# Patient Record
Sex: MALE | Race: OTHER | HISPANIC OR LATINO | URBAN - METROPOLITAN AREA
[De-identification: names, ages, dates, MRNs, and addresses within clinical notes are randomized per-mention and may not be internally consistent; named-entity substitution may affect disease eponyms.]

---

## 2017-11-19 ENCOUNTER — EMERGENCY (EMERGENCY)
Facility: HOSPITAL | Age: 52
LOS: 1 days | Discharge: HOME | End: 2017-11-19
Attending: EMERGENCY MEDICINE | Admitting: EMERGENCY MEDICINE
Payer: MEDICARE

## 2017-11-19 VITALS
DIASTOLIC BLOOD PRESSURE: 81 MMHG | HEART RATE: 94 BPM | SYSTOLIC BLOOD PRESSURE: 133 MMHG | RESPIRATION RATE: 18 BRPM | TEMPERATURE: 98 F | WEIGHT: 195.77 LBS | OXYGEN SATURATION: 96 %

## 2017-11-19 DIAGNOSIS — M62.81 MUSCLE WEAKNESS (GENERALIZED): ICD-10-CM

## 2017-11-19 DIAGNOSIS — F10.10 ALCOHOL ABUSE, UNCOMPLICATED: ICD-10-CM

## 2017-11-19 PROCEDURE — 99284 EMERGENCY DEPT VISIT MOD MDM: CPT | Mod: 25

## 2017-11-19 NOTE — ED ADULT TRIAGE NOTE - CHIEF COMPLAINT QUOTE
s/p fall this morning with right leg pain, hx of alcohol withdrawal, Hx of stroke 10 years ago with right sided body weakness, pt wants to go to detox center,  information and addresses of detox center provided to the pt.

## 2017-11-20 VITALS
OXYGEN SATURATION: 99 % | DIASTOLIC BLOOD PRESSURE: 78 MMHG | HEART RATE: 88 BPM | TEMPERATURE: 98 F | SYSTOLIC BLOOD PRESSURE: 132 MMHG | RESPIRATION RATE: 18 BRPM

## 2017-11-20 PROCEDURE — 99283 EMERGENCY DEPT VISIT LOW MDM: CPT

## 2017-11-20 RX ADMIN — Medication 25 MILLIGRAM(S): at 00:41

## 2017-11-20 NOTE — ED PROVIDER NOTE - OBJECTIVE STATEMENT
52M with hx of alcohol abuse, hx of stroke 9 years ago, states he is scheduled to get into detox tomorrow. States his last drink was 3-4 hours ago, feels he may be withdrawing but motivated to quit. Denies headache, sleeping comfortably, denies trauma, fall, head injury or any discomfort. States he has his usual post stroke pain in his R leg.

## 2017-11-20 NOTE — ED PROVIDER NOTE - CONSTITUTIONAL, MLM
normal... Well appearing, well nourished, awake, alert, oriented to person, place, situation and in no apparent distress. sleeping comfortably

## 2017-11-20 NOTE — ED PROVIDER NOTE - MEDICAL DECISION MAKING DETAILS
52M with hx of alcohol abuse, alcohol w/d denies fall, walked to ER himself, tolerating PO sandwich, sleeping comfortably, mild tremor but otherwise no pain, no new injury chronic issue with stroke, pt has mild w/d symptoms on exam, has detox tomorrow in AM. Plan for Librium and discharge home to detox.

## 2018-11-05 ENCOUNTER — EMERGENCY (EMERGENCY)
Facility: HOSPITAL | Age: 53
LOS: 1 days | Discharge: HOME | End: 2018-11-05
Attending: EMERGENCY MEDICINE | Admitting: EMERGENCY MEDICINE
Payer: SELF-PAY

## 2018-11-05 VITALS
HEART RATE: 97 BPM | SYSTOLIC BLOOD PRESSURE: 142 MMHG | WEIGHT: 169.98 LBS | RESPIRATION RATE: 14 BRPM | OXYGEN SATURATION: 97 % | DIASTOLIC BLOOD PRESSURE: 90 MMHG | TEMPERATURE: 98 F

## 2018-11-05 DIAGNOSIS — M54.9 DORSALGIA, UNSPECIFIED: ICD-10-CM

## 2018-11-05 DIAGNOSIS — M62.830 MUSCLE SPASM OF BACK: ICD-10-CM

## 2018-11-05 DIAGNOSIS — F17.210 NICOTINE DEPENDENCE, CIGARETTES, UNCOMPLICATED: ICD-10-CM

## 2018-11-05 DIAGNOSIS — Z98.890 OTHER SPECIFIED POSTPROCEDURAL STATES: Chronic | ICD-10-CM

## 2018-11-05 LAB
ALBUMIN SERPL ELPH-MCNC: 4 G/DL — SIGNIFICANT CHANGE UP (ref 3.4–5)
ALP SERPL-CCNC: 71 U/L — SIGNIFICANT CHANGE UP (ref 40–120)
ALT FLD-CCNC: 27 U/L — SIGNIFICANT CHANGE UP (ref 12–42)
AMPHET UR-MCNC: NEGATIVE — SIGNIFICANT CHANGE UP
ANION GAP SERPL CALC-SCNC: 8 MMOL/L — LOW (ref 9–16)
APPEARANCE UR: CLEAR — SIGNIFICANT CHANGE UP
AST SERPL-CCNC: 27 U/L — SIGNIFICANT CHANGE UP (ref 15–37)
BARBITURATES UR SCN-MCNC: NEGATIVE — SIGNIFICANT CHANGE UP
BENZODIAZ UR-MCNC: POSITIVE
BILIRUB SERPL-MCNC: 0.8 MG/DL — SIGNIFICANT CHANGE UP (ref 0.2–1.2)
BILIRUB UR-MCNC: ABNORMAL
BUN SERPL-MCNC: 10 MG/DL — SIGNIFICANT CHANGE UP (ref 7–23)
CALCIUM SERPL-MCNC: 9.5 MG/DL — SIGNIFICANT CHANGE UP (ref 8.5–10.5)
CHLORIDE SERPL-SCNC: 102 MMOL/L — SIGNIFICANT CHANGE UP (ref 96–108)
CO2 SERPL-SCNC: 29 MMOL/L — SIGNIFICANT CHANGE UP (ref 22–31)
COCAINE METAB.OTHER UR-MCNC: POSITIVE
COLOR SPEC: YELLOW — SIGNIFICANT CHANGE UP
CREAT SERPL-MCNC: 1.02 MG/DL — SIGNIFICANT CHANGE UP (ref 0.5–1.3)
DIFF PNL FLD: NEGATIVE — SIGNIFICANT CHANGE UP
EPI CELLS # UR: SIGNIFICANT CHANGE UP /HPF (ref 0–5)
ETHANOL SERPL-MCNC: <3 MG/DL — SIGNIFICANT CHANGE UP
GLUCOSE SERPL-MCNC: 98 MG/DL — SIGNIFICANT CHANGE UP (ref 70–99)
GLUCOSE UR QL: NEGATIVE — SIGNIFICANT CHANGE UP
HCT VFR BLD CALC: 43.8 % — SIGNIFICANT CHANGE UP (ref 39–50)
HGB BLD-MCNC: 15.4 G/DL — SIGNIFICANT CHANGE UP (ref 13–17)
KETONES UR-MCNC: 40 MG/DL
LEUKOCYTE ESTERASE UR-ACNC: NEGATIVE — SIGNIFICANT CHANGE UP
LIDOCAIN IGE QN: 159 U/L — SIGNIFICANT CHANGE UP (ref 73–393)
MCHC RBC-ENTMCNC: 33.2 PG — SIGNIFICANT CHANGE UP (ref 27–34)
MCHC RBC-ENTMCNC: 35.2 G/DL — SIGNIFICANT CHANGE UP (ref 32–36)
MCV RBC AUTO: 94.4 FL — SIGNIFICANT CHANGE UP (ref 80–100)
METHADONE UR-MCNC: NEGATIVE — SIGNIFICANT CHANGE UP
NITRITE UR-MCNC: NEGATIVE — SIGNIFICANT CHANGE UP
OPIATES UR-MCNC: NEGATIVE — SIGNIFICANT CHANGE UP
PCP SPEC-MCNC: SIGNIFICANT CHANGE UP
PCP UR-MCNC: NEGATIVE — SIGNIFICANT CHANGE UP
PH UR: 6 — SIGNIFICANT CHANGE UP (ref 5–8)
PLATELET # BLD AUTO: 136 K/UL — LOW (ref 150–400)
POTASSIUM SERPL-MCNC: 4.3 MMOL/L — SIGNIFICANT CHANGE UP (ref 3.5–5.3)
POTASSIUM SERPL-SCNC: 4.3 MMOL/L — SIGNIFICANT CHANGE UP (ref 3.5–5.3)
PROT SERPL-MCNC: 8.4 G/DL — HIGH (ref 6.4–8.2)
PROT UR-MCNC: ABNORMAL MG/DL
RBC # BLD: 4.64 M/UL — SIGNIFICANT CHANGE UP (ref 4.2–5.8)
RBC # FLD: 12.4 % — SIGNIFICANT CHANGE UP (ref 10.3–14.5)
RBC CASTS # UR COMP ASSIST: < 5 /HPF — SIGNIFICANT CHANGE UP
SODIUM SERPL-SCNC: 139 MMOL/L — SIGNIFICANT CHANGE UP (ref 132–145)
SP GR SPEC: >=1.03 — SIGNIFICANT CHANGE UP (ref 1–1.03)
THC UR QL: NEGATIVE — SIGNIFICANT CHANGE UP
TROPONIN I SERPL-MCNC: <0.017 NG/ML — LOW (ref 0.02–0.06)
UROBILINOGEN FLD QL: 1 E.U./DL — SIGNIFICANT CHANGE UP
WBC # BLD: 7.8 K/UL — SIGNIFICANT CHANGE UP (ref 3.8–10.5)
WBC # FLD AUTO: 7.8 K/UL — SIGNIFICANT CHANGE UP (ref 3.8–10.5)
WBC UR QL: < 5 /HPF — SIGNIFICANT CHANGE UP

## 2018-11-05 PROCEDURE — 99284 EMERGENCY DEPT VISIT MOD MDM: CPT | Mod: 25

## 2018-11-05 PROCEDURE — 70450 CT HEAD/BRAIN W/O DYE: CPT | Mod: 26

## 2018-11-05 PROCEDURE — 99053 MED SERV 10PM-8AM 24 HR FAC: CPT

## 2018-11-05 PROCEDURE — 93010 ELECTROCARDIOGRAM REPORT: CPT

## 2018-11-05 RX ORDER — METHOCARBAMOL 500 MG/1
1500 TABLET, FILM COATED ORAL ONCE
Qty: 0 | Refills: 0 | Status: DISCONTINUED | OUTPATIENT
Start: 2018-11-05 | End: 2018-11-05

## 2018-11-05 RX ORDER — CYCLOBENZAPRINE HYDROCHLORIDE 10 MG/1
10 TABLET, FILM COATED ORAL ONCE
Qty: 0 | Refills: 0 | Status: COMPLETED | OUTPATIENT
Start: 2018-11-05 | End: 2018-11-05

## 2018-11-05 RX ORDER — SODIUM CHLORIDE 9 MG/ML
3 INJECTION INTRAMUSCULAR; INTRAVENOUS; SUBCUTANEOUS ONCE
Qty: 0 | Refills: 0 | Status: COMPLETED | OUTPATIENT
Start: 2018-11-05 | End: 2018-11-05

## 2018-11-05 RX ORDER — KETOROLAC TROMETHAMINE 30 MG/ML
15 SYRINGE (ML) INJECTION ONCE
Qty: 0 | Refills: 0 | Status: DISCONTINUED | OUTPATIENT
Start: 2018-11-05 | End: 2018-11-05

## 2018-11-05 RX ADMIN — SODIUM CHLORIDE 3 MILLILITER(S): 9 INJECTION INTRAMUSCULAR; INTRAVENOUS; SUBCUTANEOUS at 01:21

## 2018-11-05 RX ADMIN — CYCLOBENZAPRINE HYDROCHLORIDE 10 MILLIGRAM(S): 10 TABLET, FILM COATED ORAL at 01:20

## 2018-11-05 RX ADMIN — Medication 15 MILLIGRAM(S): at 01:20

## 2018-11-05 NOTE — ED PROVIDER NOTE - ENMT, MLM
Airway patent, Nasal mucosa clear. Mouth with normal mucosa. Throat has no vesicles, no oropharyngeal exudates and uvula is midline. Craniotomy cicatrix left parietal.

## 2018-11-05 NOTE — ED ADULT NURSE NOTE - CHIEF COMPLAINT QUOTE
BIBA from [facility] shelter on [address] c/o generalized body aches and pain. Ambulated with cane at baseline. denies any recent fall/injury/trauma and no s/s of such, admits to drinking 4 cans of beer and smoking thc. hx cva.

## 2018-11-05 NOTE — ED ADULT NURSE NOTE - NURSING NEURO LEVEL OF CONSCIOUSNESS
admits to drinking four cans and beer and smoking thc. no s/s injury/trauma no neuro deficits noted.

## 2018-11-05 NOTE — ED ADULT TRIAGE NOTE - CHIEF COMPLAINT QUOTE
BIBA from [facility] shelter on [address] c/o generalized body aches and pain. Ambulated with cane at baseline. denies any recent fall/injury/trauma and no s/s of such. hx cva. BIBA from [facility] shelter on [address] c/o generalized body aches and pain. Ambulated with cane at baseline. denies any recent fall/injury/trauma and no s/s of such, admits to drinking 4 cans of beer and smoking thc. hx cva.

## 2018-11-05 NOTE — ED PROVIDER NOTE - PSYCHIATRIC NEGATIVE STATEMENT, MLM
Initial musculoskeletal treatment recommendation:    1.  Stretch the calf muscles as instructed once an hour.  2.  Ice the injured area in the evening; 20 min on/off.  3.  Take antiinflammatory medication as directed.  4.  Massage the soft tissues around the injured area in the morning to loosen the tissue  5.  Wear supportive foot wear and/or arch supports (rigid not cushion).      If no improvement in symptoms within four to six weeks, return to clinic for reevaluation.       no known mental health issues.

## 2018-11-05 NOTE — ED ADULT NURSE NOTE - CHPI ED NUR SYMPTOMS NEG
no neck tenderness/no tingling/no constipation/no bladder dysfunction/no fatigue/no motor function loss/no anorexia/no difficulty bearing weight/no bowel dysfunction/no numbness
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
## 2018-12-21 ENCOUNTER — HOSPITAL ENCOUNTER (INPATIENT)
Dept: HOSPITAL 74 - YASAS | Age: 53
LOS: 9 days | Discharge: HOME | DRG: 772 | End: 2018-12-30
Attending: PSYCHIATRY & NEUROLOGY | Admitting: PSYCHIATRY & NEUROLOGY
Payer: COMMERCIAL

## 2018-12-21 VITALS — BODY MASS INDEX: 31 KG/M2

## 2018-12-21 DIAGNOSIS — I69.851: ICD-10-CM

## 2018-12-21 DIAGNOSIS — F03.90: ICD-10-CM

## 2018-12-21 DIAGNOSIS — F32.9: ICD-10-CM

## 2018-12-21 DIAGNOSIS — F14.20: ICD-10-CM

## 2018-12-21 DIAGNOSIS — F10.282: ICD-10-CM

## 2018-12-21 DIAGNOSIS — F12.20: ICD-10-CM

## 2018-12-21 DIAGNOSIS — F10.20: Primary | ICD-10-CM

## 2018-12-21 LAB
APPEARANCE UR: (no result)
BILIRUB UR STRIP.AUTO-MCNC: NEGATIVE MG/DL
COLOR UR: YELLOW
KETONES UR QL STRIP: NEGATIVE
LEUKOCYTE ESTERASE UR QL STRIP.AUTO: NEGATIVE
NITRITE UR QL STRIP: NEGATIVE
PH UR: 6 [PH] (ref 5–8)
PROT UR QL STRIP: NEGATIVE
PROT UR QL STRIP: NEGATIVE
SP GR UR: 1.01 (ref 1.01–1.03)
UROBILINOGEN UR STRIP-MCNC: NEGATIVE MG/DL (ref 0.2–1)

## 2018-12-21 PROCEDURE — HZ42ZZZ GROUP COUNSELING FOR SUBSTANCE ABUSE TREATMENT, COGNITIVE-BEHAVIORAL: ICD-10-PCS | Performed by: PSYCHIATRY & NEUROLOGY

## 2018-12-21 RX ADMIN — GABAPENTIN SCH: 300 CAPSULE ORAL at 21:34

## 2018-12-21 RX ADMIN — Medication SCH MG: at 21:34

## 2018-12-21 RX ADMIN — GABAPENTIN SCH MG: 300 CAPSULE ORAL at 21:34

## 2018-12-21 NOTE — HP
CIWA Score





- Admission Criteria


[facility] Guidelines: Admission for Medically Managed Detox: 


Requires at least one of the followin. CIWA greater than 12


2. Seizures within the past 24 hours


3. Delirium tremens within the past 24 hours


4. Hallucinations within the past 24 hours


5. Acute intervention needed for co  occurring medical disorder


6. Acute intervention needed for co  occurring psychiatric disorder


7. Severe withdrawal that cannot be handled at a lower level of care (continued


    vomiting, continued diarrhea, abnormal vital signs) requiring intravenous


    medication and/or fluids


8. Pregnancy








Admission ROS S





- HPI


Allergies/Adverse Reactions: 


 Allergies











Allergy/AdvReac Type Severity Reaction Status Date / Time


 


No Known Allergies Allergy   Verified 18 14:31











History of Present Illness: 





patient here for rehab , after detox @ [facility] d/c today, has d/c 

paperwork indicating same , prior ETOH use 4-5  large cans  beer/day since age 

21 , longest  sobriety 7 years at age 31 ( stopped on his own ) , re-started 

with friends , reports irritability if  not drinking , had CVA  12 years ago 

from cociane use with residual right -sided weakness, using cane for ambulation 

.


utox + bzo 


cocaine : crack 200 $/day latest use 2  weeks ago , denies IVDU  , denies 

current  daily use 


tobacco : occasional  use 


cannabis :  occasional 


PMHX :  liver disease 


PShx :brain surgery for ICH 12 years ago at [facility] 


psych : denies 


SHx : lives in shelter, homeless  


Exam Limitations: Clinical Condition





- Ebola screening


Have you traveled outside of the country in the last 21 days: No (N)


Have you had contact with anyone from an Ebola affected area: No


Have you been sick,other than usual withdrawal symptoms: No


Do you have a fever: No





- Review of Systems


Constitutional: See HPI


EENT: reports: No Symptoms Reported


Respiratory: reports: No Symptoms reported


Cardiac: reports: No Symptoms Reported


GI: reports: No Symptoms Reported


: reports: No Symptoms Reported


Musculoskeletal: reports: See HPI


Integumentary: reports: Rash (on face , given ointment at [place])


Neuro: reports: See HPI, Pre-Existing Deficit, Weakness, Unsteady Gait


Endocrine: reports: No Symptoms Reported


Psychiatric: reports: Orientated x3, Anxious





Patient History





- Patient Medical History


Hx Anemia: No


Hx Asthma: No


Hx Chronic Obstructive Pulmonary Disease (COPD): No


Hx Cancer: No


Hx Cardiac Disorders: No


Hx Congestive Heart Failure: No


Hx Hypertension: No


Hx Hypercholesterolemia: No


HX Cerebrovascular Accident: Yes (WITH RIGHT SIDED WEAKNESS IN 2010 )


Hx Seizures: No


Hx Dementia: Yes (FORGETFULNESS DUE TO STROKE)


Hx Diabetes: No


Hx Gastrointestinal Disorders: No


Hx Liver Disease: Yes (BUT NOT SURE WHAT THE DX)


Hx Genitourinary Disorders: No


Hx Sexually Transmitted Disorders: No


Hx Renal Disease (ESRD): No


Hx Thyroid Disease: No


Hx Human Immunodeficiency Virus (HIV): No (NEGATIVE HX)


Hx Hepatitis C:  (NOT SURE BUT SAYS HAS "A BAD LIVER BUT NOT ALL THAT BAD")


Hx Depression: Yes (B/C LIVE BY MYSELF)


Hx Suicide Attempt: No (DENIES)


Hx Bipolar Disorder: No


Hx Schizophrenia: No





- Patient Surgical History


Past Surgical History: Yes


Hx Neurologic Surgery: Yes (left craniotomy in  (stroke))


Hx Cataract Extraction: No


Hx Cardiac Surgery: No


Hx Lung Surgery: No


Hx Breast Surgery: No


Hx Breast Biopsy: No


Hx Abdominal Surgery: No


Hx Appendectomy: No


Hx Cholecystectomy: No


Hx Genitourinary Surgery: No


Hx  Section: No


Hx Orthopedic Surgery: No


Anesthesia Reaction: No





- PPD History


Date: 16





- Smoking Cessation


Smoking history: Current every day smoker


Have you smoked in the past 12 months: Yes


Aproximately how many cigarettes per day: 2


Hx Chewing Tobacco Use: No


Initiated information on smoking cessation: No





- Substances Abused


  ** Crack


Route: Smoking


Frequency: 1-2 times per week


Amount used: $100


Age of first use: 31


Date of Last Use: 12/10/18





  ** Alcohol-beer


Route: Oral


Frequency: Daily


Amount used: 4-5 (24 oz.)


Age of first use: 31


Date of Last Use: 18





Family Disease History





- Family Disease History


Family Disease History: Other: Brother (SUBSTANCE ABUSE), Sister (SUBSTANCE 

ABUSE)





Admission Physical Exam BHS





- Vital Signs


Vital Signs: 


 Vital Signs - 24 hr











  18





  11:37


 


Temperature 98.2 F


 


Pulse Rate 79


 


Respiratory 20





Rate 


 


Blood Pressure 97/69














- Physical


General Appearance: Yes: No Apparent Distress, Other (ambulating with cane)


HEENTM: Yes: Hearing grossly Normal, Normocephalic, Normal Voice


Respiratory: Yes: Chest Non-Tender, Lungs Clear, Normal Breath Sounds


Neck: Yes: No masses,lesions,Nodules, Trachea in good position


Breast: Yes: Breast Exam Deferred, Within Normal Limits


Cardiology: Yes: Regular Rhythm, Regular Rate, S1, S2


Abdominal: Yes: Normal Bowel Sounds, Soft, Protuberent


Genitourinary: Yes: Within Normal Limits


Back: Yes: Muscle Spasm


Musculoskeletal: Yes: Muscle weakness (r-sided weakness s/p CVA), Other


Extremities: Yes: Normal Capillary Refill, Normal Inspection, Normal Range of 

Motion


Neurological: Yes: Normal Mood/Affect, Other (R-sided weakness  , r hand  

contracture , mild  foot drop right)


Integumentary: Yes: Normal Color, Dry, Warm





BHS Breath Alcohol Content


Breath Alcohol Content: 0





Urine Drug Screen





- Results


Drug Screen Negative: No


Urine Drug Screen Results: BZO-Benzodiazepines





Inpatient Rehab Admission





- Initial Determination


Are CD services needed?: Yes


Free of communicable disease: Yes


Not in need of hospitalization: Yes





- Rehab Admission Criteria


Previous failed treatment: Yes


Poor recovery environment: Yes


Comorbidities: Yes


Lacks judgement: Yes


Patient is meeting Inpatient Rehab admission criteria:: Yes

## 2018-12-22 LAB
ALBUMIN SERPL-MCNC: 3.9 G/DL (ref 3.4–5)
ALP SERPL-CCNC: 61 U/L (ref 45–117)
ALT SERPL-CCNC: 29 U/L (ref 13–61)
ANION GAP SERPL CALC-SCNC: 6 MMOL/L (ref 8–16)
AST SERPL-CCNC: 24 U/L (ref 15–37)
BILIRUB SERPL-MCNC: 0.4 MG/DL (ref 0.2–1)
BUN SERPL-MCNC: 15 MG/DL (ref 7–18)
CALCIUM SERPL-MCNC: 9.2 MG/DL (ref 8.5–10.1)
CHLORIDE SERPL-SCNC: 100 MMOL/L (ref 98–107)
CO2 SERPL-SCNC: 31 MMOL/L (ref 21–32)
CREAT SERPL-MCNC: 0.9 MG/DL (ref 0.55–1.3)
DEPRECATED RDW RBC AUTO: 13.9 % (ref 11.9–15.9)
GLUCOSE SERPL-MCNC: 85 MG/DL (ref 74–106)
HCT VFR BLD CALC: 48.7 % (ref 35.4–49)
HGB BLD-MCNC: 16.2 GM/DL (ref 11.7–16.9)
MCH RBC QN AUTO: 32.3 PG (ref 25.7–33.7)
MCHC RBC AUTO-ENTMCNC: 33.3 G/DL (ref 32–35.9)
MCV RBC: 97.1 FL (ref 80–96)
PLATELET # BLD AUTO: 150 K/MM3 (ref 134–434)
PMV BLD: 8.4 FL (ref 7.5–11.1)
POTASSIUM SERPLBLD-SCNC: 4.7 MMOL/L (ref 3.5–5.1)
PROT SERPL-MCNC: 7.9 G/DL (ref 6.4–8.2)
RBC # BLD AUTO: 5.01 M/MM3 (ref 4–5.6)
SODIUM SERPL-SCNC: 136 MMOL/L (ref 136–145)
WBC # BLD AUTO: 4.7 K/MM3 (ref 4–10)

## 2018-12-22 RX ADMIN — GABAPENTIN SCH MG: 300 CAPSULE ORAL at 22:00

## 2018-12-22 RX ADMIN — IBUPROFEN PRN MG: 400 TABLET, FILM COATED ORAL at 19:28

## 2018-12-22 RX ADMIN — GABAPENTIN SCH MG: 300 CAPSULE ORAL at 06:27

## 2018-12-22 RX ADMIN — Medication SCH TAB: at 10:03

## 2018-12-22 RX ADMIN — Medication SCH MG: at 22:00

## 2018-12-22 RX ADMIN — BACLOFEN SCH MG: 10 TABLET ORAL at 10:03

## 2018-12-22 RX ADMIN — GABAPENTIN SCH MG: 300 CAPSULE ORAL at 13:55
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
## 2018-12-23 RX ADMIN — IBUPROFEN PRN MG: 400 TABLET, FILM COATED ORAL at 16:58

## 2018-12-23 RX ADMIN — Medication SCH TAB: at 10:32

## 2018-12-23 RX ADMIN — GABAPENTIN SCH MG: 300 CAPSULE ORAL at 14:17

## 2018-12-23 RX ADMIN — BACLOFEN SCH MG: 10 TABLET ORAL at 10:32

## 2018-12-23 RX ADMIN — Medication SCH MG: at 21:26

## 2018-12-23 RX ADMIN — GABAPENTIN SCH MG: 300 CAPSULE ORAL at 21:26

## 2018-12-23 RX ADMIN — GABAPENTIN SCH MG: 300 CAPSULE ORAL at 06:19

## 2018-12-24 RX ADMIN — BACLOFEN SCH MG: 10 TABLET ORAL at 10:34

## 2018-12-24 RX ADMIN — GABAPENTIN SCH MG: 300 CAPSULE ORAL at 21:31

## 2018-12-24 RX ADMIN — Medication SCH MG: at 21:30

## 2018-12-24 RX ADMIN — Medication SCH TAB: at 10:34

## 2018-12-24 RX ADMIN — GABAPENTIN SCH MG: 300 CAPSULE ORAL at 14:21

## 2018-12-24 RX ADMIN — Medication PRN MG: at 21:32

## 2018-12-24 RX ADMIN — GABAPENTIN SCH MG: 300 CAPSULE ORAL at 06:33

## 2018-12-25 RX ADMIN — GABAPENTIN SCH MG: 300 CAPSULE ORAL at 21:20

## 2018-12-25 RX ADMIN — IBUPROFEN PRN MG: 400 TABLET, FILM COATED ORAL at 06:23

## 2018-12-25 RX ADMIN — GABAPENTIN SCH MG: 300 CAPSULE ORAL at 13:25

## 2018-12-25 RX ADMIN — Medication PRN MG: at 21:20

## 2018-12-25 RX ADMIN — GABAPENTIN SCH MG: 300 CAPSULE ORAL at 06:22

## 2018-12-25 RX ADMIN — TOLNAFTATE SCH: 10 CREAM TOPICAL at 21:20

## 2018-12-25 RX ADMIN — Medication SCH TAB: at 10:32

## 2018-12-25 RX ADMIN — TOLNAFTATE SCH APPLIC: 10 CREAM TOPICAL at 13:25

## 2018-12-25 RX ADMIN — IBUPROFEN PRN MG: 400 TABLET, FILM COATED ORAL at 19:09

## 2018-12-25 RX ADMIN — Medication SCH MG: at 21:22

## 2018-12-25 RX ADMIN — BACLOFEN SCH MG: 10 TABLET ORAL at 10:32

## 2018-12-26 RX ADMIN — GABAPENTIN SCH MG: 300 CAPSULE ORAL at 21:29

## 2018-12-26 RX ADMIN — GABAPENTIN SCH MG: 300 CAPSULE ORAL at 13:37

## 2018-12-26 RX ADMIN — Medication SCH MG: at 21:29

## 2018-12-26 RX ADMIN — Medication PRN MG: at 21:29

## 2018-12-26 RX ADMIN — IBUPROFEN PRN MG: 400 TABLET, FILM COATED ORAL at 10:20

## 2018-12-26 RX ADMIN — GABAPENTIN SCH MG: 300 CAPSULE ORAL at 06:17

## 2018-12-26 RX ADMIN — Medication SCH TAB: at 10:20

## 2018-12-26 RX ADMIN — TOLNAFTATE SCH: 10 CREAM TOPICAL at 10:20

## 2018-12-26 RX ADMIN — BACLOFEN SCH MG: 10 TABLET ORAL at 10:20

## 2018-12-26 RX ADMIN — IBUPROFEN PRN MG: 400 TABLET, FILM COATED ORAL at 21:29

## 2018-12-26 RX ADMIN — TOLNAFTATE SCH APPLIC: 10 CREAM TOPICAL at 21:30

## 2018-12-27 RX ADMIN — Medication SCH TAB: at 10:32

## 2018-12-27 RX ADMIN — TOLNAFTATE SCH: 10 CREAM TOPICAL at 10:32

## 2018-12-27 RX ADMIN — TOLNAFTATE SCH APPLIC: 10 CREAM TOPICAL at 23:09

## 2018-12-27 RX ADMIN — Medication PRN MG: at 21:16

## 2018-12-27 RX ADMIN — IBUPROFEN PRN MG: 400 TABLET, FILM COATED ORAL at 21:17

## 2018-12-27 RX ADMIN — BACLOFEN SCH MG: 10 TABLET ORAL at 10:32

## 2018-12-27 RX ADMIN — GABAPENTIN SCH MG: 300 CAPSULE ORAL at 05:43

## 2018-12-27 RX ADMIN — GABAPENTIN SCH MG: 300 CAPSULE ORAL at 14:04

## 2018-12-27 RX ADMIN — GABAPENTIN SCH MG: 300 CAPSULE ORAL at 21:16

## 2018-12-27 RX ADMIN — Medication SCH MG: at 21:16

## 2018-12-28 RX ADMIN — Medication SCH MG: at 21:25

## 2018-12-28 RX ADMIN — TOLNAFTATE SCH APPLIC: 10 CREAM TOPICAL at 21:25

## 2018-12-28 RX ADMIN — BACLOFEN SCH MG: 10 TABLET ORAL at 10:59

## 2018-12-28 RX ADMIN — GABAPENTIN SCH MG: 300 CAPSULE ORAL at 21:25

## 2018-12-28 RX ADMIN — IBUPROFEN PRN MG: 400 TABLET, FILM COATED ORAL at 07:36

## 2018-12-28 RX ADMIN — TOLNAFTATE SCH: 10 CREAM TOPICAL at 10:59

## 2018-12-28 RX ADMIN — IBUPROFEN PRN MG: 400 TABLET, FILM COATED ORAL at 21:24

## 2018-12-28 RX ADMIN — Medication SCH TAB: at 10:59

## 2018-12-28 RX ADMIN — GABAPENTIN SCH MG: 300 CAPSULE ORAL at 05:45

## 2018-12-28 RX ADMIN — GABAPENTIN SCH MG: 300 CAPSULE ORAL at 14:08

## 2018-12-28 RX ADMIN — Medication PRN MG: at 21:25

## 2018-12-29 RX ADMIN — GABAPENTIN SCH MG: 300 CAPSULE ORAL at 21:12

## 2018-12-29 RX ADMIN — Medication SCH TAB: at 09:11

## 2018-12-29 RX ADMIN — TOLNAFTATE SCH: 10 CREAM TOPICAL at 09:11

## 2018-12-29 RX ADMIN — IBUPROFEN PRN MG: 400 TABLET, FILM COATED ORAL at 14:13

## 2018-12-29 RX ADMIN — GABAPENTIN SCH MG: 300 CAPSULE ORAL at 05:38

## 2018-12-29 RX ADMIN — IBUPROFEN PRN MG: 400 TABLET, FILM COATED ORAL at 21:13

## 2018-12-29 RX ADMIN — TOLNAFTATE SCH: 10 CREAM TOPICAL at 21:13

## 2018-12-29 RX ADMIN — GABAPENTIN SCH MG: 300 CAPSULE ORAL at 14:11

## 2018-12-29 RX ADMIN — BACLOFEN SCH MG: 10 TABLET ORAL at 09:10

## 2018-12-29 RX ADMIN — Medication SCH MG: at 21:13

## 2018-12-29 NOTE — PN
BHS Progress Note


Note: 





Psychiatry


Attending's note :


Called to address this patient's request to leave the program.


Came to meet with patient in order to explore his reasons.


Chart reviewed. Brief meeting with Mr Avalos.Patient has changed his mind. 


Wants to stay. Issue resolved.

## 2018-12-30 VITALS — HEART RATE: 73 BPM | DIASTOLIC BLOOD PRESSURE: 88 MMHG | SYSTOLIC BLOOD PRESSURE: 126 MMHG | TEMPERATURE: 98.2 F

## 2018-12-30 RX ADMIN — GABAPENTIN SCH MG: 300 CAPSULE ORAL at 06:24

## 2018-12-30 RX ADMIN — BACLOFEN SCH MG: 10 TABLET ORAL at 09:25

## 2018-12-30 RX ADMIN — TOLNAFTATE SCH: 10 CREAM TOPICAL at 09:25

## 2018-12-30 RX ADMIN — IBUPROFEN PRN MG: 400 TABLET, FILM COATED ORAL at 06:26

## 2018-12-30 RX ADMIN — Medication SCH TAB: at 09:25

## 2018-12-30 NOTE — PN
BHS Progress Note


Note: 





Patient requests to be discharged before his scheduled discharge date for 

personal reason. He was seen by the weekend counselor and was given a referral 

to [facility] for outpatient treatment. He is stable for discharge 

today. Refer to staff note for further information

## 2019-12-18 NOTE — ED PROVIDER NOTE - NSCAREINITIATED _GEN_ER
Procedure(s):   SECTION. spinal    Anesthesia Post Evaluation      Multimodal analgesia: multimodal analgesia used between 6 hours prior to anesthesia start to PACU discharge  Patient location during evaluation: bedside  Patient participation: complete - patient participated  Level of consciousness: awake and alert  Pain management: adequate  Airway patency: patent  Anesthetic complications: no  Cardiovascular status: hemodynamically stable  Respiratory status: spontaneous ventilation  Hydration status: euvolemic  Comments: Patient stable and may discharge at this time. Good result with spinal anesthesia, resolving normally. No vitals data found for the desired time range.
Dhiraj Duarte(Attending)

## 2021-01-03 ENCOUNTER — INPATIENT (INPATIENT)
Facility: HOSPITAL | Age: 56
LOS: 1 days | Discharge: HOME | DRG: 897 | End: 2021-01-05
Attending: HOSPITALIST | Admitting: HOSPITALIST
Payer: MEDICARE

## 2021-01-03 VITALS
SYSTOLIC BLOOD PRESSURE: 107 MMHG | DIASTOLIC BLOOD PRESSURE: 72 MMHG | HEART RATE: 88 BPM | TEMPERATURE: 98 F | RESPIRATION RATE: 20 BRPM | OXYGEN SATURATION: 95 %

## 2021-01-03 DIAGNOSIS — D69.6 THROMBOCYTOPENIA, UNSPECIFIED: ICD-10-CM

## 2021-01-03 DIAGNOSIS — F10.239 ALCOHOL DEPENDENCE WITH WITHDRAWAL, UNSPECIFIED: ICD-10-CM

## 2021-01-03 DIAGNOSIS — R73.9 HYPERGLYCEMIA, UNSPECIFIED: ICD-10-CM

## 2021-01-03 DIAGNOSIS — Z29.9 ENCOUNTER FOR PROPHYLACTIC MEASURES, UNSPECIFIED: ICD-10-CM

## 2021-01-03 DIAGNOSIS — F10.959 ALCOHOL USE, UNSPECIFIED WITH ALCOHOL-INDUCED PSYCHOTIC DISORDER, UNSPECIFIED: ICD-10-CM

## 2021-01-03 DIAGNOSIS — S06.5X9A TRAUMATIC SUBDURAL HEMORRHAGE WITH LOSS OF CONSCIOUSNESS OF UNSPECIFIED DURATION, INITIAL ENCOUNTER: ICD-10-CM

## 2021-01-03 DIAGNOSIS — Z98.890 OTHER SPECIFIED POSTPROCEDURAL STATES: Chronic | ICD-10-CM

## 2021-01-03 DIAGNOSIS — E87.2 ACIDOSIS: ICD-10-CM

## 2021-01-03 DIAGNOSIS — I95.9 HYPOTENSION, UNSPECIFIED: ICD-10-CM

## 2021-01-03 DIAGNOSIS — F19.10 OTHER PSYCHOACTIVE SUBSTANCE ABUSE, UNCOMPLICATED: ICD-10-CM

## 2021-01-03 LAB
A1C WITH ESTIMATED AVERAGE GLUCOSE RESULT: 4.9 % — SIGNIFICANT CHANGE UP (ref 4–5.6)
ALBUMIN SERPL ELPH-MCNC: 3.7 G/DL — SIGNIFICANT CHANGE UP (ref 3.3–5)
ALBUMIN SERPL ELPH-MCNC: 4.2 G/DL — SIGNIFICANT CHANGE UP (ref 3.3–5)
ALP SERPL-CCNC: 50 U/L — SIGNIFICANT CHANGE UP (ref 40–120)
ALP SERPL-CCNC: 50 U/L — SIGNIFICANT CHANGE UP (ref 40–120)
ALT FLD-CCNC: 16 U/L — SIGNIFICANT CHANGE UP (ref 10–45)
ALT FLD-CCNC: 18 U/L — SIGNIFICANT CHANGE UP (ref 10–45)
ANION GAP SERPL CALC-SCNC: 10 MMOL/L — SIGNIFICANT CHANGE UP (ref 5–17)
ANION GAP SERPL CALC-SCNC: 19 MMOL/L — HIGH (ref 5–17)
AST SERPL-CCNC: 22 U/L — SIGNIFICANT CHANGE UP (ref 10–40)
AST SERPL-CCNC: 25 U/L — SIGNIFICANT CHANGE UP (ref 10–40)
B-OH-BUTYR SERPL-SCNC: 0.5 MMOL/L — HIGH
BASOPHILS # BLD AUTO: 0.03 K/UL — SIGNIFICANT CHANGE UP (ref 0–0.2)
BASOPHILS NFR BLD AUTO: 0.3 % — SIGNIFICANT CHANGE UP (ref 0–2)
BILIRUB SERPL-MCNC: 0.6 MG/DL — SIGNIFICANT CHANGE UP (ref 0.2–1.2)
BILIRUB SERPL-MCNC: 0.7 MG/DL — SIGNIFICANT CHANGE UP (ref 0.2–1.2)
BUN SERPL-MCNC: 12 MG/DL — SIGNIFICANT CHANGE UP (ref 7–23)
BUN SERPL-MCNC: 14 MG/DL — SIGNIFICANT CHANGE UP (ref 7–23)
CALCIUM SERPL-MCNC: 8.2 MG/DL — LOW (ref 8.4–10.5)
CALCIUM SERPL-MCNC: 9.2 MG/DL — SIGNIFICANT CHANGE UP (ref 8.4–10.5)
CHLORIDE SERPL-SCNC: 104 MMOL/L — SIGNIFICANT CHANGE UP (ref 96–108)
CHLORIDE SERPL-SCNC: 99 MMOL/L — SIGNIFICANT CHANGE UP (ref 96–108)
CO2 SERPL-SCNC: 21 MMOL/L — LOW (ref 22–31)
CO2 SERPL-SCNC: 25 MMOL/L — SIGNIFICANT CHANGE UP (ref 22–31)
CREAT SERPL-MCNC: 0.74 MG/DL — SIGNIFICANT CHANGE UP (ref 0.5–1.3)
CREAT SERPL-MCNC: 0.99 MG/DL — SIGNIFICANT CHANGE UP (ref 0.5–1.3)
EOSINOPHIL # BLD AUTO: 0.08 K/UL — SIGNIFICANT CHANGE UP (ref 0–0.5)
EOSINOPHIL NFR BLD AUTO: 0.9 % — SIGNIFICANT CHANGE UP (ref 0–6)
ESTIMATED AVERAGE GLUCOSE: 94 MG/DL — SIGNIFICANT CHANGE UP (ref 68–114)
ETHANOL SERPL-MCNC: 55 MG/DL — HIGH (ref 0–10)
GLUCOSE SERPL-MCNC: 238 MG/DL — HIGH (ref 70–99)
GLUCOSE SERPL-MCNC: 81 MG/DL — SIGNIFICANT CHANGE UP (ref 70–99)
HCT VFR BLD CALC: 44.6 % — SIGNIFICANT CHANGE UP (ref 39–50)
HGB BLD-MCNC: 15 G/DL — SIGNIFICANT CHANGE UP (ref 13–17)
IMM GRANULOCYTES NFR BLD AUTO: 0.4 % — SIGNIFICANT CHANGE UP (ref 0–1.5)
LACTATE SERPL-SCNC: 1.2 MMOL/L — SIGNIFICANT CHANGE UP (ref 0.5–2)
LYMPHOCYTES # BLD AUTO: 0.98 K/UL — LOW (ref 1–3.3)
LYMPHOCYTES # BLD AUTO: 10.7 % — LOW (ref 13–44)
MAGNESIUM SERPL-MCNC: 2.2 MG/DL — SIGNIFICANT CHANGE UP (ref 1.6–2.6)
MCHC RBC-ENTMCNC: 33.3 PG — SIGNIFICANT CHANGE UP (ref 27–34)
MCHC RBC-ENTMCNC: 33.6 GM/DL — SIGNIFICANT CHANGE UP (ref 32–36)
MCV RBC AUTO: 98.9 FL — SIGNIFICANT CHANGE UP (ref 80–100)
MONOCYTES # BLD AUTO: 0.4 K/UL — SIGNIFICANT CHANGE UP (ref 0–0.9)
MONOCYTES NFR BLD AUTO: 4.4 % — SIGNIFICANT CHANGE UP (ref 2–14)
NEUTROPHILS # BLD AUTO: 7.6 K/UL — HIGH (ref 1.8–7.4)
NEUTROPHILS NFR BLD AUTO: 83.3 % — HIGH (ref 43–77)
NRBC # BLD: 0 /100 WBCS — SIGNIFICANT CHANGE UP (ref 0–0)
PCP SPEC-MCNC: SIGNIFICANT CHANGE UP
PHOSPHATE SERPL-MCNC: 5.6 MG/DL — HIGH (ref 2.5–4.5)
PLATELET # BLD AUTO: 123 K/UL — LOW (ref 150–400)
POTASSIUM SERPL-MCNC: 4.1 MMOL/L — SIGNIFICANT CHANGE UP (ref 3.5–5.3)
POTASSIUM SERPL-MCNC: 4.4 MMOL/L — SIGNIFICANT CHANGE UP (ref 3.5–5.3)
POTASSIUM SERPL-SCNC: 4.1 MMOL/L — SIGNIFICANT CHANGE UP (ref 3.5–5.3)
POTASSIUM SERPL-SCNC: 4.4 MMOL/L — SIGNIFICANT CHANGE UP (ref 3.5–5.3)
PROT SERPL-MCNC: 6.7 G/DL — SIGNIFICANT CHANGE UP (ref 6–8.3)
PROT SERPL-MCNC: 7.1 G/DL — SIGNIFICANT CHANGE UP (ref 6–8.3)
RBC # BLD: 4.51 M/UL — SIGNIFICANT CHANGE UP (ref 4.2–5.8)
RBC # FLD: 12.5 % — SIGNIFICANT CHANGE UP (ref 10.3–14.5)
SARS-COV-2 RNA SPEC QL NAA+PROBE: SIGNIFICANT CHANGE UP
SODIUM SERPL-SCNC: 139 MMOL/L — SIGNIFICANT CHANGE UP (ref 135–145)
SODIUM SERPL-SCNC: 139 MMOL/L — SIGNIFICANT CHANGE UP (ref 135–145)
WBC # BLD: 9.13 K/UL — SIGNIFICANT CHANGE UP (ref 3.8–10.5)
WBC # FLD AUTO: 9.13 K/UL — SIGNIFICANT CHANGE UP (ref 3.8–10.5)

## 2021-01-03 PROCEDURE — 99223 1ST HOSP IP/OBS HIGH 75: CPT | Mod: GC

## 2021-01-03 PROCEDURE — 70450 CT HEAD/BRAIN W/O DYE: CPT | Mod: 26

## 2021-01-03 PROCEDURE — 99285 EMERGENCY DEPT VISIT HI MDM: CPT

## 2021-01-03 RX ORDER — DEXTROSE 50 % IN WATER 50 %
25 SYRINGE (ML) INTRAVENOUS ONCE
Refills: 0 | Status: DISCONTINUED | OUTPATIENT
Start: 2021-01-03 | End: 2021-01-03

## 2021-01-03 RX ORDER — SODIUM CHLORIDE 9 MG/ML
1000 INJECTION INTRAMUSCULAR; INTRAVENOUS; SUBCUTANEOUS ONCE
Refills: 0 | Status: COMPLETED | OUTPATIENT
Start: 2021-01-03 | End: 2021-01-03

## 2021-01-03 RX ORDER — THIAMINE MONONITRATE (VIT B1) 100 MG
100 TABLET ORAL DAILY
Refills: 0 | Status: DISCONTINUED | OUTPATIENT
Start: 2021-01-03 | End: 2021-01-05

## 2021-01-03 RX ORDER — ENOXAPARIN SODIUM 100 MG/ML
40 INJECTION SUBCUTANEOUS EVERY 24 HOURS
Refills: 0 | Status: DISCONTINUED | OUTPATIENT
Start: 2021-01-03 | End: 2021-01-05

## 2021-01-03 RX ORDER — DEXTROSE 50 % IN WATER 50 %
15 SYRINGE (ML) INTRAVENOUS ONCE
Refills: 0 | Status: DISCONTINUED | OUTPATIENT
Start: 2021-01-03 | End: 2021-01-03

## 2021-01-03 RX ORDER — DEXTROSE 50 % IN WATER 50 %
12.5 SYRINGE (ML) INTRAVENOUS ONCE
Refills: 0 | Status: DISCONTINUED | OUTPATIENT
Start: 2021-01-03 | End: 2021-01-03

## 2021-01-03 RX ORDER — ONDANSETRON 8 MG/1
4 TABLET, FILM COATED ORAL ONCE
Refills: 0 | Status: COMPLETED | OUTPATIENT
Start: 2021-01-03 | End: 2021-01-03

## 2021-01-03 RX ORDER — SODIUM CHLORIDE 9 MG/ML
1000 INJECTION, SOLUTION INTRAVENOUS
Refills: 0 | Status: DISCONTINUED | OUTPATIENT
Start: 2021-01-03 | End: 2021-01-03

## 2021-01-03 RX ORDER — FOLIC ACID 0.8 MG
1 TABLET ORAL DAILY
Refills: 0 | Status: DISCONTINUED | OUTPATIENT
Start: 2021-01-03 | End: 2021-01-05

## 2021-01-03 RX ORDER — GLUCAGON INJECTION, SOLUTION 0.5 MG/.1ML
1 INJECTION, SOLUTION SUBCUTANEOUS ONCE
Refills: 0 | Status: DISCONTINUED | OUTPATIENT
Start: 2021-01-03 | End: 2021-01-03

## 2021-01-03 RX ORDER — INSULIN LISPRO 100/ML
VIAL (ML) SUBCUTANEOUS
Refills: 0 | Status: DISCONTINUED | OUTPATIENT
Start: 2021-01-03 | End: 2021-01-03

## 2021-01-03 RX ADMIN — ENOXAPARIN SODIUM 40 MILLIGRAM(S): 100 INJECTION SUBCUTANEOUS at 22:59

## 2021-01-03 RX ADMIN — SODIUM CHLORIDE 1000 MILLILITER(S): 9 INJECTION INTRAMUSCULAR; INTRAVENOUS; SUBCUTANEOUS at 16:45

## 2021-01-03 RX ADMIN — SODIUM CHLORIDE 1000 MILLILITER(S): 9 INJECTION INTRAMUSCULAR; INTRAVENOUS; SUBCUTANEOUS at 15:00

## 2021-01-03 RX ADMIN — ONDANSETRON 4 MILLIGRAM(S): 8 TABLET, FILM COATED ORAL at 16:42

## 2021-01-03 RX ADMIN — Medication 25 MILLIGRAM(S): at 18:47

## 2021-01-03 NOTE — ED ADULT TRIAGE NOTE - CHIEF COMPLAINT QUOTE
Pt BIBA from shelter, [facility].  Per EMS, shelter admin nasal narcan, EMS placed PIV to Right hand and admin 0.5 mg Narcan IVP.  IV dislodged PTA.  Pt upgraded to MD Wade.

## 2021-01-03 NOTE — ED ADULT NURSE NOTE - OBJECTIVE STATEMENT
55 year old M patient brought in by EMS for lethargy & drug abuse @ shelter.  As per EMS patient was found laying next to a pipe with other drug paraphernalia.  Narcan IVP given with minimal response.  Pt responsive to painful stimuli, not following commands appropriately.  Minimal amount of pink tinged sputum noted to shirt.  Chest rise equal and appears unlabored.  Pt placed on CM, 3L of O2 via nasal cannula.  IV line placed, blood work sent.  Pt to go for further imaging.  NO distress noted.  Pt placed in yellow gown and all belongings placed in lock cabinet for safety.  WIll continue to monitor closely.  WILI noted.  No signs of trauma noted.

## 2021-01-03 NOTE — H&P ADULT - HISTORY OF PRESENT ILLNESS
55M c hx history of polysubstance abuse, and CVA c residual deficits ambulates with cane presented from [facility] for AMS. Patient found today in O unconscious next to drug paraphernalia EMS called who found patient with pin-point pupils and administered peripheral Narcan 0.5mg at the scene and brought patient to the ED.  Once patient regained consciousness evaluated by case management and felt patient unsafe discharge.     ED course:  Vitals: T afebrile, HR 68-88 BP 92//72 RR 20-16 SpO2 95%-100%3L Now saturating 97% on RA  Labs: CBC: WBC 9, Hgb 15 . BMP notable for Bicarb 21. Gap 19.  Glucose 238. UTOX positive for benzos and cocaine. BAL 53. UA notable for spec grav 1.030.   Imaging: < from: CT Head No Cont (01.03.21 @ 14:43) >  IMPRESSION:  No CT evidence of acute intracranial hemorrhage, brain edema, mass effect or acute territorial infarct. Status post left temporoparietal craniotomy with large area of subjacent old infarction.  < end of copied text >  EKG: NSR. QTc 456.   Interventions: Librium 25mg at 6pm 55M c hx history of polysubstance abuse, and CVA c residual deficits ambulates with cane presented from [facility] for AMS. Patient found today in [facility] unconscious next to drug paraphernalia EMS called who found patient with pin-point pupils and administered peripheral Narcan 0.5mg at the scene and brought patient to the ED.  Once patient regained consciousness evaluated by case management and felt patient unsafe discharge.     ED course:  Vitals: T afebrile, HR 68-88 BP 92//72 RR 20-16 SpO2 95%-100%3L Now saturating 97% on RA  Labs: CBC: WBC 9, Hgb 15 . BMP notable for Bicarb 21. Gap 19.  Glucose 238. UTOX positive for benzos and cocaine. BAL 53. UA notable for spec grav 1.030.   Imaging: < from: CT Head No Cont (01.03.21 @ 14:43) >  IMPRESSION:  No CT evidence of acute intracranial hemorrhage, brain edema, mass effect or acute territorial infarct. Status post left temporoparietal craniotomy with large area of subjacent old infarction.  < end of copied text >  EKG: NSR. QTc 456.   Interventions: Librium 25mg at 6pm. 2L of NS. Zofran 4mg.  55M c hx history of polysubstance abuse (Weed, crack, cocaine, cigarettes and alcohol-approximately 1-2pints daily?), and CVA c residual deficits ambulates with cane presented from [facility] for AMS. Patient found today in [facility] unconscious next to drug paraphernalia EMS called who found patient with pin-point pupils and administered peripheral Narcan 0.5mg at the scene and brought patient to the ED.  Once patient regained consciousness evaluated by case management and felt patient unsafe discharge. Patient unsure if he has ever had withdrawal seizures or been intubated/admitted to MICU for withdrawal in the past.     ED course:  Vitals: T afebrile, HR 68-88 BP 92//72 RR 20-16 SpO2 95%-100%3L Now saturating 97% on RA  Labs: CBC: WBC 9, Hgb 15 . BMP notable for Bicarb 21. Gap 19.  Glucose 238. UTOX positive for benzos and cocaine. BAL 53. UA notable for spec grav 1.030.   Imaging: < from: CT Head No Cont (01.03.21 @ 14:43) >  IMPRESSION:  No CT evidence of acute intracranial hemorrhage, brain edema, mass effect or acute territorial infarct. Status post left temporoparietal craniotomy with large area of subjacent old infarction.  < end of copied text >  EKG: NSR. QTc 456.   Interventions: Librium 25mg at 6pm. 2L of NS. Zofran 4mg.  55M c hx history of polysubstance abuse (Weed, crack, cocaine, cigarettes and alcohol-approximately 1-2pints daily?), and CVA (traumatic brain injury 14 years ago-subdural hematoma and craniotomy [place]) c residual deficits ambulates with cane presented from [facility] for AMS. Patient found today in [facility] unconscious next to drug paraphernalia EMS called who found patient with pin-point pupils and administered peripheral Narcan 0.5mg at the scene and brought patient to the ED.  Once patient regained consciousness evaluated by case management and felt patient unsafe discharge. Patient unsure if he has ever had withdrawal seizures or been intubated/admitted to MICU for withdrawal in the past.     ED course:  Vitals: T afebrile, HR 68-88 BP 92//72 RR 20-16 SpO2 95%-100%3L Now saturating 97% on RA  Labs: CBC: WBC 9, Hgb 15 . BMP notable for Bicarb 21. Gap 19.  Glucose 238. UTOX positive for benzos and cocaine. BAL 53. UA notable for spec grav 1.030.   Imaging: < from: CT Head No Cont (01.03.21 @ 14:43) >  IMPRESSION:  No CT evidence of acute intracranial hemorrhage, brain edema, mass effect or acute territorial infarct. Status post left temporoparietal craniotomy with large area of subjacent old infarction.  < end of copied text >  EKG: NSR. QTc 456.   Interventions: Librium 25mg at 6pm. 2L of NS. Zofran 4mg.

## 2021-01-03 NOTE — ED ADULT NURSE NOTE - NURSING NEURO LEVEL OF CONSCIOUSNESS
Take lasix in am and ditropan before bed  Did she ask if anyone in their practice is taking new pts?? I told her she can see ANYONE on the practice!!!! lethargic

## 2021-01-03 NOTE — H&P ADULT - PROBLEM SELECTOR PLAN 7
Hx of sub-dural hematoma s/p craniotomy several years ago at [facility]. With chronic R sided deficits. Neuro exam as above-ambulates with cane at baseline.  - PT eval in AM

## 2021-01-03 NOTE — ED PROVIDER NOTE - SHIFT CHANGE DETAILS
55M polysubstance abuse ("crack" cocaine/etoh), prior cva w/ residual gen weakness req cane to ambulate, biba intoxicated. s/p narcan. no e/o WD/DTs in ED. s/p librium ppx. upon sobriety pt unable to ambulate at baseline.  saw pt and agrees w/ unsafe discharge. pt admitted for PT evaluation and continued monitoring for poss WD/DTs, pending s/o.    dispo: pending s/o

## 2021-01-03 NOTE — ED PROVIDER NOTE - CARE PLAN
Principal Discharge DX:	Drug intoxication without complication   Principal Discharge DX:	Inability to ambulate due to multiple joints   Principal Discharge DX:	Inability to ambulate due to multiple joints  Secondary Diagnosis:	Drug intoxication without complication

## 2021-01-03 NOTE — H&P ADULT - PROBLEM SELECTOR PLAN 9
DVT: Lovenox   GI: not indicated    F: s/p 2L IVF  E: K> 4, Mg> 2  N: Carb consistent    Other: Social work

## 2021-01-03 NOTE — H&P ADULT - PROBLEM SELECTOR PLAN 3
Endorses drinking 1-2 pints beer daily. No evidence of alcoholic cirrhosis on exam. Elevated BAL but does not appear to be acutely withdrawing.   - C/w high risk CIWA protocol   - hold on librium for now  - c/w ativan 2mg IV PRN for CIWA >8  - thiamine, folate and MV

## 2021-01-03 NOTE — H&P ADULT - NSHPPHYSICALEXAM_GEN_ALL_CORE
Vital Signs (24 Hrs):  T(C): 36.8 (01-03-21 @ 19:35), Max: 36.9 (01-03-21 @ 13:33)  HR: 89 (01-03-21 @ 19:35) (68 - 89)  BP: 105/70 (01-03-21 @ 19:35) (92/53 - 107/72)  RR: 20 (01-03-21 @ 19:35) (16 - 20)  SpO2: 95% (01-03-21 @ 19:35) (95% - 100%)  Wt(kg): -- DISPLAY PLAN FREE TEXT Vital Signs (24 Hrs):  T(C): 36.8 (01-03-21 @ 19:35), Max: 36.9 (01-03-21 @ 13:33)  HR: 89 (01-03-21 @ 19:35) (68 - 89)  BP: 105/70 (01-03-21 @ 19:35) (92/53 - 107/72)  RR: 20 (01-03-21 @ 19:35) (16 - 20)  SpO2: 95% (01-03-21 @ 19:35) (95% - 100%)  Wt(kg): --    PHYSICAL EXAM:  GENERAL: NAD. Resting in bed comfortably.   HEENT: DMM. No tongue fasciculations   CHEST/LUNG: CTA B/L. Protecting airway.   HEART: RRR. S1S2  ABDOMEN: NTND BS+4.  EXTREMITIES:  2+ Peripheral Pulses. No tremors.  PSYCH: AAOx to self, aware in hospital, unable to state name, unsure of year. Not acutely agitated or anxious.   NEUROLOGY: AAOx1, Slight R sided facial droop, with 3/5 strength RUE and 3/5 strength RLE with decreased sensation on R compare to L.   SKIN: No rashes or lesions  CIWA: 4 for nausea alone.

## 2021-01-03 NOTE — ED PROVIDER NOTE - CLINICAL SUMMARY MEDICAL DECISION MAKING FREE TEXT BOX
Patient demonstrates clinical evidence for alcohol intoxication.  Patient admits to intentional heavy drinking  of alcohol and drug and denies fall or injury.  Patient also denies drug use.  Some of the history and physicial exam is limited due to the state of intoxication.  All clothes were removed, all parts of body were evaluated and there is no evidence of acute trauma.  Plan is to observe patient in the ED until clinical sobriety is reached and reassess history and physical examination. Patient demonstrates clinical evidence for alcohol intoxication.  Patient admits to intentional heavy drinking  of alcohol and drug and denies fall or injury.  Patient also denies drug use.  Some of the history and physicial exam is limited due to the state of intoxication.  All clothes were removed, all parts of body were evaluated and there is no evidence of acute trauma.  Plan is to observe patient in the ED until clinical sobriety is reached and reassess history and physical examination.    update: pt now sober, but unable to safely walk with cane. Seen by CM. Required 3 of us to even get pt up. has no help at [facility] and barely managing. D/w CM - needs admission for social reasons until safer dc plan made. Pt states weakness chronic since his subdural, and has a lot of difficulty getting to the bathroom and back with a cane. Denies new weakness or symptoms. tolerating po now and abdomen remains soft. plan for librium to stave off any withdrawal

## 2021-01-03 NOTE — H&P ADULT - PROBLEM SELECTOR PLAN 1
Patient presented with AMS in setting of acute polysubstance use with +UTOX for benzodiazepines, cocaine, and alcohol. Patient now awake and alert protecting airway and endorsing last use earlier today. Low suspicion for metabolic or infectious cause of AMS as afebrile and no leukocytosis, CTH negative for acute neuro event. Patient likely altered in setting of polysubstance use. s/p 2L NS in ED with zofran 4mg and librium 25mg.  Patient not currently withdrawing.  - Will place on high-risk CIWA protocol based on history   - hold off on continued librium now as patient not acutely withdrawing  - supportive care: zofran, tylenol, and IVF for benzo withdrawal  - c/w supplemental O2 as needed

## 2021-01-03 NOTE — ED PROVIDER NOTE - PHYSICAL EXAMINATION
CONSTITUTIONAL: Well-appearing; well-nourished; in no apparent distress.   HEAD: Normocephalic; atraumatic.   EYES:  conjunctiva and sclera clear  ENT: normal nose; no rhinorrhea; normal pharynx with no erythema or lesions.   NECK: Supple; non-tender;   CARDIOVASCULAR: Normal S1, S2; no murmurs, rubs, or gallops. Regular rate and rhythm.   RESPIRATORY: Breathing easily; breath sounds clear and equal bilaterally; no wheezes, rhonchi, or rales.  GI: Soft; non-distended; non-tender; no palpable organomegaly.   EXT: No cyanosis or edema; N/V intact  SKIN: Normal for age and race; warm; dry; good turgor; no apparent lesions or rash.   NEURO: A & O x 3; face symmetric; grossly unremarkable.   PSYCHOLOGICAL: The patient’s mood and manner are appropriate. CONSTITUTIONAL: disheveled older gentleman  HEAD: Normocephalic; atraumatic.   EYES:  conjunctiva and sclera clear  ENT: normal nose; no rhinorrhea; normal pharynx with no erythema or lesions.   NECK: Supple; non-tender;   CARDIOVASCULAR: Normal S1, S2; no murmurs, rubs, or gallops. Regular rate and rhythm.   RESPIRATORY: Breathing easily; breath sounds clear and equal bilaterally; no wheezes, rhonchi, or rales.  GI: Soft; non-distended; non-tender; no palpable organomegaly.   EXT: No cyanosis or edema; N/V intact  SKIN: Normal for age and race; warm; dry; good turgor; no apparent lesions or rash.   NEURO: opens eyes, responsive to painful stimuli  PSYCHOLOGICAL: unable to obtain, unresponsive

## 2021-01-03 NOTE — ED PROVIDER NOTE - OBJECTIVE STATEMENT
[facility] from [facility] with [facility] for acute AMS. EMS states drug paraphernalia all around pt, and pinpoint pupils initially, s/p narcan, pts eyes now dilated, but still altered. pt unable to particpiate in a history.

## 2021-01-03 NOTE — ED ADULT NURSE REASSESSMENT NOTE - NS ED NURSE REASSESS COMMENT FT1
Pt unable to ambulate with steady gait, states he gets help @ the staff from the shelter.  Pt to be admitted to hospital.

## 2021-01-03 NOTE — ED ADULT NURSE NOTE - CHIEF COMPLAINT QUOTE
Pt BIBA from shelter, [facility].  Per EMS, shelter admin nasal narcan, EMS placed PIV to Right hand and admin 0.5 mg Narcan IVP.  IV dislodged PTA.  Pt upgraded to MD Wade.

## 2021-01-03 NOTE — ED ADULT NURSE NOTE - NSIMPLEMENTINTERV_GEN_ALL_ED
Implemented All Fall with Harm Risk Interventions:  [place] to call system. Call bell, personal items and telephone within reach. Instruct patient to call for assistance. Room bathroom lighting operational. Non-slip footwear when patient is off stretcher. Physically safe environment: no spills, clutter or unnecessary equipment. Stretcher in lowest position, wheels locked, appropriate side rails in place. Provide visual cue, wrist band, yellow gown, etc. Monitor gait and stability. Monitor for mental status changes and reorient to person, place, and time. Review medications for side effects contributing to fall risk. Reinforce activity limits and safety measures with patient and family. Provide visual clues: red socks.

## 2021-01-03 NOTE — ED PROVIDER NOTE - PATIENT PORTAL LINK FT
You can access the FollowMyHealth Patient Portal offered by [facility] by registering at the following website: [email or web address]. By joining [employer]’s FollowMyHealth portal, you will also be able to view your health information using other applications (apps) compatible with our system.

## 2021-01-03 NOTE — H&P ADULT - PROBLEM SELECTOR PLAN 6
Patient with hx of thrombocytopenia. Likely 2/2 to drug abuse. No evidence of bleeding or bruising on exam  - trend CBCs Patient with hx of thrombocytopenia. Likely 2/2 to drug abuse/alcohol. No evidence of bleeding or bruising on exam  - trend CBCs

## 2021-01-03 NOTE — H&P ADULT - ASSESSMENT
55M c hx history of polysubstance abuse, and CVA c residual deficits ambulates with cane [facility] for [facility] in setting of polysubstance abuse s/p narcan now being admitted for continued monitoring with inability to ambulate and new anion gap metabolic acidosis.

## 2021-01-03 NOTE — H&P ADULT - NSHPLABSRESULTS_GEN_ALL_CORE
.  LABS:                         15.0   9.13  )-----------( 123      ( 03 Jan 2021 13:55 )             44.6     01-03    139  |  99  |  14  ----------------------------<  238<H>  4.1   |  21<L>  |  0.99    Ca    9.2      03 Jan 2021 13:55    TPro  7.1  /  Alb  4.2  /  TBili  0.7  /  DBili  x   /  AST  25  /  ALT  18  /  AlkPhos  50  01-03                  RADIOLOGY, EKG & ADDITIONAL TESTS: Reviewed.

## 2021-01-03 NOTE — H&P ADULT - PROBLEM SELECTOR PLAN 5
Patient with elevated anion gap metabolic acidosis on admission labs suspect likely alcoholic/starvation ketosis. Less likely DKA as no hx of DM. Lactate WNL. No s/p IVF.  - f/u repeat CMP  - f/u beta-hydroxy butyrate  - advanced diet as tolerated  - f/u A1c

## 2021-01-03 NOTE — H&P ADULT - PROBLEM SELECTOR PLAN 8
CMP with elevated glucose. No hx of DM  - f/u a1c  - f/u beta hydrxoy  - mISS as need  - carb consistent diet CMP with elevated glucose. No hx of DM. Resolved  - f/u a1c  - f/u beta hydrxoy  - mISS as needed-->can hold for now  - carb consistent diet

## 2021-01-04 LAB
A1C WITH ESTIMATED AVERAGE GLUCOSE RESULT: 5.1 % — SIGNIFICANT CHANGE UP (ref 4–5.6)
ALBUMIN SERPL ELPH-MCNC: 3.5 G/DL — SIGNIFICANT CHANGE UP (ref 3.3–5)
ALP SERPL-CCNC: 44 U/L — SIGNIFICANT CHANGE UP (ref 40–120)
ALT FLD-CCNC: 14 U/L — SIGNIFICANT CHANGE UP (ref 10–45)
AMPHET UR-MCNC: NEGATIVE — SIGNIFICANT CHANGE UP
ANION GAP SERPL CALC-SCNC: 7 MMOL/L — SIGNIFICANT CHANGE UP (ref 5–17)
APTT BLD: 32.1 SEC — SIGNIFICANT CHANGE UP (ref 27.5–35.5)
AST SERPL-CCNC: 23 U/L — SIGNIFICANT CHANGE UP (ref 10–40)
BARBITURATES UR SCN-MCNC: NEGATIVE — SIGNIFICANT CHANGE UP
BASOPHILS # BLD AUTO: 0.03 K/UL — SIGNIFICANT CHANGE UP (ref 0–0.2)
BASOPHILS NFR BLD AUTO: 0.5 % — SIGNIFICANT CHANGE UP (ref 0–2)
BENZODIAZ UR-MCNC: NEGATIVE — SIGNIFICANT CHANGE UP
BILIRUB SERPL-MCNC: 0.9 MG/DL — SIGNIFICANT CHANGE UP (ref 0.2–1.2)
BLD GP AB SCN SERPL QL: NEGATIVE — SIGNIFICANT CHANGE UP
BLD GP AB SCN SERPL QL: NEGATIVE — SIGNIFICANT CHANGE UP
BUN SERPL-MCNC: 13 MG/DL — SIGNIFICANT CHANGE UP (ref 7–23)
CALCIUM SERPL-MCNC: 8.6 MG/DL — SIGNIFICANT CHANGE UP (ref 8.4–10.5)
CHLORIDE SERPL-SCNC: 102 MMOL/L — SIGNIFICANT CHANGE UP (ref 96–108)
CO2 SERPL-SCNC: 29 MMOL/L — SIGNIFICANT CHANGE UP (ref 22–31)
COCAINE METAB.OTHER UR-MCNC: POSITIVE
CREAT SERPL-MCNC: 0.85 MG/DL — SIGNIFICANT CHANGE UP (ref 0.5–1.3)
EOSINOPHIL # BLD AUTO: 0.08 K/UL — SIGNIFICANT CHANGE UP (ref 0–0.5)
EOSINOPHIL NFR BLD AUTO: 1.3 % — SIGNIFICANT CHANGE UP (ref 0–6)
ESTIMATED AVERAGE GLUCOSE: 100 MG/DL — SIGNIFICANT CHANGE UP (ref 68–114)
GLUCOSE SERPL-MCNC: 90 MG/DL — SIGNIFICANT CHANGE UP (ref 70–99)
HCT VFR BLD CALC: 39.2 % — SIGNIFICANT CHANGE UP (ref 39–50)
HGB BLD-MCNC: 13 G/DL — SIGNIFICANT CHANGE UP (ref 13–17)
IMM GRANULOCYTES NFR BLD AUTO: 0.3 % — SIGNIFICANT CHANGE UP (ref 0–1.5)
INR BLD: 1.09 — SIGNIFICANT CHANGE UP (ref 0.88–1.16)
LYMPHOCYTES # BLD AUTO: 1.12 K/UL — SIGNIFICANT CHANGE UP (ref 1–3.3)
LYMPHOCYTES # BLD AUTO: 18.5 % — SIGNIFICANT CHANGE UP (ref 13–44)
MAGNESIUM SERPL-MCNC: 2 MG/DL — SIGNIFICANT CHANGE UP (ref 1.6–2.6)
MCHC RBC-ENTMCNC: 33 PG — SIGNIFICANT CHANGE UP (ref 27–34)
MCHC RBC-ENTMCNC: 33.2 GM/DL — SIGNIFICANT CHANGE UP (ref 32–36)
MCV RBC AUTO: 99.5 FL — SIGNIFICANT CHANGE UP (ref 80–100)
METHADONE UR-MCNC: NEGATIVE — SIGNIFICANT CHANGE UP
MONOCYTES # BLD AUTO: 0.36 K/UL — SIGNIFICANT CHANGE UP (ref 0–0.9)
MONOCYTES NFR BLD AUTO: 6 % — SIGNIFICANT CHANGE UP (ref 2–14)
NEUTROPHILS # BLD AUTO: 4.44 K/UL — SIGNIFICANT CHANGE UP (ref 1.8–7.4)
NEUTROPHILS NFR BLD AUTO: 73.4 % — SIGNIFICANT CHANGE UP (ref 43–77)
NRBC # BLD: 0 /100 WBCS — SIGNIFICANT CHANGE UP (ref 0–0)
OPIATES UR-MCNC: POSITIVE
PCP UR-MCNC: NEGATIVE — SIGNIFICANT CHANGE UP
PHOSPHATE SERPL-MCNC: 3.1 MG/DL — SIGNIFICANT CHANGE UP (ref 2.5–4.5)
PLATELET # BLD AUTO: 83 K/UL — LOW (ref 150–400)
POTASSIUM SERPL-MCNC: 4.3 MMOL/L — SIGNIFICANT CHANGE UP (ref 3.5–5.3)
POTASSIUM SERPL-SCNC: 4.3 MMOL/L — SIGNIFICANT CHANGE UP (ref 3.5–5.3)
PROT SERPL-MCNC: 6.3 G/DL — SIGNIFICANT CHANGE UP (ref 6–8.3)
PROTHROM AB SERPL-ACNC: 13 SEC — SIGNIFICANT CHANGE UP (ref 10.6–13.6)
RBC # BLD: 3.94 M/UL — LOW (ref 4.2–5.8)
RBC # FLD: 12.5 % — SIGNIFICANT CHANGE UP (ref 10.3–14.5)
RH IG SCN BLD-IMP: POSITIVE — SIGNIFICANT CHANGE UP
RH IG SCN BLD-IMP: POSITIVE — SIGNIFICANT CHANGE UP
SODIUM SERPL-SCNC: 138 MMOL/L — SIGNIFICANT CHANGE UP (ref 135–145)
THC UR QL: POSITIVE
WBC # BLD: 6.05 K/UL — SIGNIFICANT CHANGE UP (ref 3.8–10.5)
WBC # FLD AUTO: 6.05 K/UL — SIGNIFICANT CHANGE UP (ref 3.8–10.5)

## 2021-01-04 PROCEDURE — 99233 SBSQ HOSP IP/OBS HIGH 50: CPT | Mod: GC

## 2021-01-04 RX ORDER — SODIUM CHLORIDE 9 MG/ML
500 INJECTION INTRAMUSCULAR; INTRAVENOUS; SUBCUTANEOUS ONCE
Refills: 0 | Status: COMPLETED | OUTPATIENT
Start: 2021-01-04 | End: 2021-01-04

## 2021-01-04 RX ORDER — ACETAMINOPHEN 500 MG
650 TABLET ORAL ONCE
Refills: 0 | Status: COMPLETED | OUTPATIENT
Start: 2021-01-04 | End: 2021-01-04

## 2021-01-04 RX ADMIN — Medication 650 MILLIGRAM(S): at 17:44

## 2021-01-04 RX ADMIN — Medication 1 MILLIGRAM(S): at 11:50

## 2021-01-04 RX ADMIN — Medication 1 TABLET(S): at 11:50

## 2021-01-04 RX ADMIN — ENOXAPARIN SODIUM 40 MILLIGRAM(S): 100 INJECTION SUBCUTANEOUS at 21:40

## 2021-01-04 RX ADMIN — Medication 100 MILLIGRAM(S): at 11:50

## 2021-01-04 RX ADMIN — SODIUM CHLORIDE 500 MILLILITER(S): 9 INJECTION INTRAMUSCULAR; INTRAVENOUS; SUBCUTANEOUS at 17:57

## 2021-01-04 RX ADMIN — Medication 650 MILLIGRAM(S): at 16:44

## 2021-01-04 NOTE — PHYSICAL THERAPY INITIAL EVALUATION ADULT - ADDITIONAL COMMENTS
Patient reports that he lives in a shelter with elevator access Patient reports that he lives in a shelter with elevator access, primarily home bound and has meals delivered to his room. Reports that he ambulates with a cane at baseline, reports being independent with ADLs. Reports having a counselor who is assisting to find him an apartment.

## 2021-01-04 NOTE — PROGRESS NOTE ADULT - PROBLEM SELECTOR PLAN 8
CMP with elevated glucose. No hx of DM. Resolved  - mISS as needed-->can hold for now  - carb consistent diet

## 2021-01-04 NOTE — PROGRESS NOTE ADULT - SUBJECTIVE AND OBJECTIVE BOX
INTERVAL HPI/OVERNIGHT EVENTS: Overnight there were no acute events. This morning the patient states that he feels well. Denies any acute complaints.     VITAL SIGNS:  T(C): 37 (01-04-21 @ 12:57), Max: 37 (01-03-21 @ 21:15)  T(F): 98.6 (01-04-21 @ 12:57), Max: 98.6 (01-03-21 @ 21:15)  HR: 94 (01-04-21 @ 12:57) (67 - 94)  BP: 99/67 (01-04-21 @ 12:57) (92/63 - 110/74)  BP(mean): --  RR: 17 (01-04-21 @ 12:57) (16 - 20)  SpO2: 94% (01-04-21 @ 12:57) (94% - 97%)  Wt(kg): --    PHYSICAL EXAM:    Constitutional: WDWN resting comfortably in bed; NAD  Head: NC/AT  Eyes: PERRL, EOMI, anicteric sclera  ENT: no nasal discharge; uvula midline, no oropharyngeal erythema or exudates; MMM  Neck: supple;   Respiratory: CTA B/L; no W/R/R, no retractions  Cardiac: +S1/S2; RRR; no M/R/G;  Gastrointestinal: soft, NT/ND; no rebound or guarding;   Extremities: WWP, no clubbing or cyanosis; no peripheral edema  Neurologic: AAOx1;  Psychiatric: affect and characteristics of appearance, verbalizations, behaviors are appropriate    MEDICATIONS  (STANDING):  enoxaparin Injectable 40 milliGRAM(s) SubCutaneous every 24 hours  folic acid 1 milliGRAM(s) Oral daily  multivitamin  Chewable 1 Tablet(s) Oral daily  thiamine 100 milliGRAM(s) Oral daily    MEDICATIONS  (PRN):  LORazepam   Injectable 2 milliGRAM(s) IV Push every 6 hours PRN Symptom-triggered: each CIWA -Ar score 8 or GREATER      Allergies    No Known Allergies    Intolerances        LABS:                        13.0   6.05  )-----------( 83       ( 04 Jan 2021 07:39 )             39.2     01-04    138  |  102  |  13  ----------------------------<  90  4.3   |  29  |  0.85    Ca    8.6      04 Jan 2021 07:39  Phos  3.1     01-04  Mg     2.0     01-04    TPro  6.3  /  Alb  3.5  /  TBili  0.9  /  DBili  x   /  AST  23  /  ALT  14  /  AlkPhos  44  01-04    PT/INR - ( 04 Jan 2021 10:37 )   PT: 13.0 sec;   INR: 1.09          PTT - ( 04 Jan 2021 10:37 )  PTT:32.1 sec      RADIOLOGY & ADDITIONAL TESTS: Reviewed

## 2021-01-04 NOTE — PROGRESS NOTE ADULT - PROBLEM SELECTOR PLAN 4
BP 92/53 in ED. likely 2/2 to drug intoxication and dehydration. Now s/p 2L IVF with improvement in BP

## 2021-01-04 NOTE — PHYSICAL THERAPY INITIAL EVALUATION ADULT - MANUAL MUSCLE TESTING RESULTS, REHAB EVAL
(B) UE and (B) LE >3+/5 upon functional assessment against gravity. *Except (R) UE, increased tone, poor motor control, poor functional use of (R) UE.

## 2021-01-04 NOTE — PROGRESS NOTE ADULT - PROBLEM SELECTOR PLAN 7
Hx of sub-dural hematoma s/p craniotomy several years ago at [facility]. With chronic R sided deficits. Neuro exam as above-ambulates with cane at baseline.  - PT eval recommend patient for XENIA

## 2021-01-04 NOTE — PROGRESS NOTE ADULT - ASSESSMENT
55M c hx history of polysubstance abuse, and CVA c residual deficits ambulates with cane [facility] in setting of polysubstance abuse s/p narcan now being admitted for continued monitoring with inability to ambulate and new anion gap metabolic acidosis, currently resolved, now mentating at baseline and awaiting placement in [facility].

## 2021-01-04 NOTE — PROGRESS NOTE ADULT - PROBLEM SELECTOR PLAN 5
RESOLVED   Patient with elevated anion gap metabolic acidosis on admission labs suspect likely alcoholic/starvation ketosis. Less likely DKA as no hx of DM. Lactate WNL. No s/p IVF.

## 2021-01-04 NOTE — PHYSICAL THERAPY INITIAL EVALUATION ADULT - ACTIVE RANGE OF MOTION EXAMINATION, REHAB EVAL
(R) UE impaired, chronic, related to hx of TBI./bilateral upper extremity Active ROM was WFL (within functional limits)/bilateral  lower extremity Active ROM was WFL (within functional limits)

## 2021-01-04 NOTE — PROGRESS NOTE ADULT - PROBLEM SELECTOR PLAN 1
RESOLVED Patient initially presented with AMS in setting of acute polysubstance use with +UTOX for benzodiazepines, cocaine, and alcohol, now awake and alert protecting airway and endorsing last use earlier today. CTH negative for acute neuro event. Patient likely altered in setting of polysubstance use. s/p 2L NS in ED with zofran 4mg and librium 25mg. Currently AAOx1  Patient not currently withdrawing.  - Will continue on high-risk CIWA protocol based on history however currently not withdrawing  - hold off on continued librium now as patient not acutely withdrawing  - supportive care: zofran, tylenol, and IVF for benzo withdrawal  - c/w supplemental O2 as needed

## 2021-01-04 NOTE — PROGRESS NOTE ADULT - PROBLEM SELECTOR PLAN 6
Patient with hx of thrombocytopenia. Likely 2/2 to drug abuse/alcohol. No evidence of bleeding or bruising on exam  - trend CBCs

## 2021-01-05 ENCOUNTER — TRANSCRIPTION ENCOUNTER (OUTPATIENT)
Age: 56
End: 2021-01-05

## 2021-01-05 VITALS
HEART RATE: 60 BPM | TEMPERATURE: 98 F | RESPIRATION RATE: 18 BRPM | SYSTOLIC BLOOD PRESSURE: 115 MMHG | DIASTOLIC BLOOD PRESSURE: 50 MMHG | OXYGEN SATURATION: 98 %

## 2021-01-05 PROBLEM — Z00.00 ENCOUNTER FOR PREVENTIVE HEALTH EXAMINATION: Status: ACTIVE | Noted: 2021-01-05

## 2021-01-05 LAB
ANION GAP SERPL CALC-SCNC: 10 MMOL/L — SIGNIFICANT CHANGE UP (ref 5–17)
BASOPHILS # BLD AUTO: 0.02 K/UL — SIGNIFICANT CHANGE UP (ref 0–0.2)
BASOPHILS NFR BLD AUTO: 0.5 % — SIGNIFICANT CHANGE UP (ref 0–2)
BUN SERPL-MCNC: 11 MG/DL — SIGNIFICANT CHANGE UP (ref 7–23)
CALCIUM SERPL-MCNC: 8.7 MG/DL — SIGNIFICANT CHANGE UP (ref 8.4–10.5)
CHLORIDE SERPL-SCNC: 98 MMOL/L — SIGNIFICANT CHANGE UP (ref 96–108)
CO2 SERPL-SCNC: 27 MMOL/L — SIGNIFICANT CHANGE UP (ref 22–31)
CREAT SERPL-MCNC: 0.82 MG/DL — SIGNIFICANT CHANGE UP (ref 0.5–1.3)
EOSINOPHIL # BLD AUTO: 0.11 K/UL — SIGNIFICANT CHANGE UP (ref 0–0.5)
EOSINOPHIL NFR BLD AUTO: 2.5 % — SIGNIFICANT CHANGE UP (ref 0–6)
GLUCOSE SERPL-MCNC: 84 MG/DL — SIGNIFICANT CHANGE UP (ref 70–99)
HCT VFR BLD CALC: 38.2 % — LOW (ref 39–50)
HGB BLD-MCNC: 13 G/DL — SIGNIFICANT CHANGE UP (ref 13–17)
IMM GRANULOCYTES NFR BLD AUTO: 0.2 % — SIGNIFICANT CHANGE UP (ref 0–1.5)
LYMPHOCYTES # BLD AUTO: 1.04 K/UL — SIGNIFICANT CHANGE UP (ref 1–3.3)
LYMPHOCYTES # BLD AUTO: 23.6 % — SIGNIFICANT CHANGE UP (ref 13–44)
MAGNESIUM SERPL-MCNC: 2.2 MG/DL — SIGNIFICANT CHANGE UP (ref 1.6–2.6)
MCHC RBC-ENTMCNC: 32.6 PG — SIGNIFICANT CHANGE UP (ref 27–34)
MCHC RBC-ENTMCNC: 34 GM/DL — SIGNIFICANT CHANGE UP (ref 32–36)
MCV RBC AUTO: 95.7 FL — SIGNIFICANT CHANGE UP (ref 80–100)
MONOCYTES # BLD AUTO: 0.3 K/UL — SIGNIFICANT CHANGE UP (ref 0–0.9)
MONOCYTES NFR BLD AUTO: 6.8 % — SIGNIFICANT CHANGE UP (ref 2–14)
NEUTROPHILS # BLD AUTO: 2.93 K/UL — SIGNIFICANT CHANGE UP (ref 1.8–7.4)
NEUTROPHILS NFR BLD AUTO: 66.4 % — SIGNIFICANT CHANGE UP (ref 43–77)
NRBC # BLD: 0 /100 WBCS — SIGNIFICANT CHANGE UP (ref 0–0)
PHOSPHATE SERPL-MCNC: 2.7 MG/DL — SIGNIFICANT CHANGE UP (ref 2.5–4.5)
PLATELET # BLD AUTO: 78 K/UL — LOW (ref 150–400)
POTASSIUM SERPL-MCNC: 3.9 MMOL/L — SIGNIFICANT CHANGE UP (ref 3.5–5.3)
POTASSIUM SERPL-SCNC: 3.9 MMOL/L — SIGNIFICANT CHANGE UP (ref 3.5–5.3)
RBC # BLD: 3.99 M/UL — LOW (ref 4.2–5.8)
RBC # FLD: 12 % — SIGNIFICANT CHANGE UP (ref 10.3–14.5)
SODIUM SERPL-SCNC: 135 MMOL/L — SIGNIFICANT CHANGE UP (ref 135–145)
WBC # BLD: 4.41 K/UL — SIGNIFICANT CHANGE UP (ref 3.8–10.5)
WBC # FLD AUTO: 4.41 K/UL — SIGNIFICANT CHANGE UP (ref 3.8–10.5)

## 2021-01-05 PROCEDURE — 99239 HOSP IP/OBS DSCHRG MGMT >30: CPT | Mod: GC

## 2021-01-05 RX ORDER — FOLIC ACID 0.8 MG
1 TABLET ORAL
Qty: 30 | Refills: 0
Start: 2021-01-05 | End: 2021-02-03

## 2021-01-05 RX ORDER — ACETAMINOPHEN 500 MG
650 TABLET ORAL ONCE
Refills: 0 | Status: COMPLETED | OUTPATIENT
Start: 2021-01-05 | End: 2021-01-05

## 2021-01-05 RX ORDER — THIAMINE MONONITRATE (VIT B1) 100 MG
1 TABLET ORAL
Qty: 30 | Refills: 0
Start: 2021-01-05 | End: 2021-02-03

## 2021-01-05 RX ADMIN — Medication 1 TABLET(S): at 11:23

## 2021-01-05 RX ADMIN — Medication 650 MILLIGRAM(S): at 09:14

## 2021-01-05 RX ADMIN — Medication 100 MILLIGRAM(S): at 11:23

## 2021-01-05 RX ADMIN — Medication 650 MILLIGRAM(S): at 02:11

## 2021-01-05 RX ADMIN — Medication 1 MILLIGRAM(S): at 11:23

## 2021-01-05 RX ADMIN — Medication 650 MILLIGRAM(S): at 03:15

## 2021-01-05 NOTE — DISCHARGE NOTE PROVIDER - NSDCADMDATE_GEN_ALL_CORE_FT
"Post-op Instructions    Please take pain medication as needed, if taking narcotics please avoid driving.   We recommend a high fiber diet and use of stool softeners while on narcotics as they might cause constipation.    You may leave the dressing in place for 24 hours. Okay to shower once dressing removed. There may be small pieces of tape over your incision called "steri strips" which will fall on their own or you can remove them in 10 days.   Avoid swimming pools, baths or hot tubs for at least 2 weeks.   Do not lift anything heavier than 10 lb for at least 2 weeks.   Please call if you have fevers, chills, shortness of breath, increased drainage from your wound or bleeding.  Please call to make an appointment in 2 weeks for wound recheck.     Do not take Klonopin while taking oxycodone for post-op pain.       " 03-Jan-2021 18:10

## 2021-01-05 NOTE — DISCHARGE NOTE PROVIDER - NSDCCPCAREPLAN_GEN_ALL_CORE_FT
PRINCIPAL DISCHARGE DIAGNOSIS  Diagnosis: Altered mental status associated with intoxication  Assessment and Plan of Treatment: You presented with altered mental status, which is when you thoughts and behavior are innapropriate and out of the ordinary for how you usually behave.      SECONDARY DISCHARGE DIAGNOSES  Diagnosis: Drug intoxication without complication  Assessment and Plan of Treatment:      PRINCIPAL DISCHARGE DIAGNOSIS  Diagnosis: Altered mental status associated with intoxication  Assessment and Plan of Treatment: You presented with altered mental status, which is when your thoughts and behavior are innapropriate and out of the ordinary for how you usually behave. In your case, this was caused by a drug overdose. We treated you with narcan, which helps treat acute opioid overdose      SECONDARY DISCHARGE DIAGNOSES  Diagnosis: Drug intoxication without complication  Assessment and Plan of Treatment:      PRINCIPAL DISCHARGE DIAGNOSIS  Diagnosis: Altered mental status associated with intoxication  Assessment and Plan of Treatment: You presented with altered mental status, which is when your thoughts and behavior are innapropriate and out of the ordinary for how you usually behave. In your case, this was caused by a drug overdose. We treated you with narcan, which helps treat acute opioid overdose. We monitered you for signs of alcohol withdrawal and you did not appear to be overdosing. We are discharging you home with an appointment for your primary care doctor. Please follow up, and discuss drug rehabilitation treatment options.      SECONDARY DISCHARGE DIAGNOSES  Diagnosis: Bunion, right  Assessment and Plan of Treatment: You have bunion on your right foot, which is an irregularity in your big toe that can cause pain and discomfort. We treated you with tylenol inpatient as you were not able to recall the name of the pain medication  you take at home. Please follow up with a podiatrist as an outpatient. The number for a podiatrist is located in this discharge paperwork.

## 2021-01-05 NOTE — DISCHARGE NOTE PROVIDER - PROVIDER TOKENS
PROVIDER:[TOKEN:[[id number]],FOLLOWUP:[2 weeks]] PROVIDER:[TOKEN:[[id number]],FOLLOWUP:[2 weeks]],PROVIDER:[TOKEN:[[id number]],FOLLOWUP:[2 weeks]] PROVIDER:[TOKEN:[[id number]],FOLLOWUP:[2 weeks]],FREE:[LAST:[[place]],FIRST:[Mark KHAN],PHONE:[phone]],FAX:[(   [phone]    -],ADDRESS:[INTERNAL MEDICINE  [address] [place] [address]],SCHEDULEDAPPT:[01/27/2021],SCHEDULEDAPPTTIME:[11:30 AM]] FREE:[LAST:[[place]],FIRST:[Mark KHAN],PHONE:[phone]],FAX:[(   )    -],ADDRESS:[INTERNAL MEDICINE  [address] [place] [address]],SCHEDULEDAPPT:[01/27/2021],SCHEDULEDAPPTTIME:[11:30 AM]]

## 2021-01-05 NOTE — DISCHARGE NOTE PROVIDER - NSDCFUADDAPPT_GEN_ALL_CORE_FT
Please schedule with any PCP Please follow up with your Primary Care Provider, Mark Gallegos at [address] [place] [address], [address], [place] [address] on 01/27/2021 at 11:30am.    Please bring your Insurance Card, Photo ID and Discharge paperwork to your appointment.    Appointment was scheduled by Ms. SAURABH Wyatt, Referral Coordinator. We were unable to schedule a podiatry appointment based on the insurance you currently hold. Please call the customer service line for your primary insurance to schedule a podiatry appointment within 2 weeks of discharge.    Please follow up with your Primary Care Provider, Mark Gallegos at [address] [place] [address], [address] , [place] [address] on 01/27/2021 at 11:30am.    Please bring your Insurance Card, Photo ID and Discharge paperwork to your appointment.    Appointment was scheduled by Ms. SAURABH Wyatt, Referral Coordinator.

## 2021-01-05 NOTE — DISCHARGE NOTE PROVIDER - NSDCMRMEDTOKEN_GEN_ALL_CORE_FT
folic acid 1 mg oral tablet: 1 tab(s) orally once a day    MEDS TO BED  Multiple Vitamins oral tablet, chewable: 1 tab(s) orally once a day    MEDS TO BED  thiamine 100 mg oral tablet: 1 tab(s) orally once a day    MEDS TO BED

## 2021-01-05 NOTE — DISCHARGE NOTE PROVIDER - CARE PROVIDER_API CALL
Glenna Rodriguez)  Internal Medicine  [address] [place] [address]  Phone: [phone]  Fax: [phone]  Follow Up Time: 2 weeks   Glenna Rodriguez)  Internal Medicine  [address] [place] [address]  Phone: [phone]  Fax: [phone]  Follow Up Time: 2 weeks    Joce French  PODIATRIC MEDICINE AND SURGERY  [address] [facility], [address]  [place] [address]  Phone: [phone]  Fax: [phone]  Follow Up Time: 2 weeks   Joce French  PODIATRIC MEDICINE AND SURGERY  [address] [facility], [address]  [place] [address]  Phone: [phone]  Fax: [phone]  Follow Up Time: 2 weeks    Mark Gallegos  INTERNAL MEDICINE  [address] [place] [address], [place] [address]  Phone: [phone]  Fax: (   )    -  Scheduled Appointment: 01/27/2021 11:30 AM   Mark Gallegos.  INTERNAL MEDICINE  [address], [address]  [place] [address]  Phone: [phone]  Fax: (   )    -  Scheduled Appointment: 01/27/2021 11:30 AM

## 2021-01-05 NOTE — DISCHARGE NOTE NURSING/CASE MANAGEMENT/SOCIAL WORK - NSDCFUADDAPPT_GEN_ALL_CORE_FT
Please follow up with your Primary Care Provider, Mark Gallegos at [address] [place] [address], [address], [place] [address] on 01/27/2021 at 11:30am.    Please bring your Insurance Card, Photo ID and Discharge paperwork to your appointment.    Appointment was scheduled by Ms. SAURABH Wyatt, Referral Coordinator.

## 2021-01-05 NOTE — DISCHARGE NOTE PROVIDER - CARE PROVIDERS DIRECT ADDRESSES
,[email or web address] ,[email or web address],DirectAddress_Unknown ,DirectAddress_Unknown,DirectAddress_Unknown ,DirectAddress_Unknown

## 2021-01-05 NOTE — DISCHARGE NOTE NURSING/CASE MANAGEMENT/SOCIAL WORK - PATIENT PORTAL LINK FT
You can access the FollowMyHealth Patient Portal offered by [facility] by registering at the following website: [email or web address]. By joining [employer]’s FollowMyHealth portal, you will also be able to view your health information using other applications (apps) compatible with our system.

## 2021-01-05 NOTE — DISCHARGE NOTE PROVIDER - HOSPITAL COURSE
#Discharge: do not delete    Patient is 56 yo M with past medical history of polysubstance abuse (Weed, crack, cocaine, cigarettes and alcohol-approximately 1-2pints daily?), and CVA (traumatic brain injury 14 years ago-subdural hematoma and craniotomy Tri) c residual deficits  Presented with altered mental status, found to have overdose from polysubstance  Problem List/Main Diagnoses (system-based):     #Altered mental status associated with intoxication  Patient presented with AMS in setting of acute polysubstance use with +UTOX for benzodiazepines, cocaine, and alcohol. CT head was negative for acute neuro event. Patient now awake and alert protecting airway and endorsing last use earlier today. Low suspicion for metabolic or infectious cause of AMS as afebrile and no leukocytosis, CTH negative for acute neuro event. Patient likely altered in setting of polysubstance use. CIWA protocol was initiated however patient was not actively withdrawing.  - f/u with PCP as outpatient, counseling on substance use disorder.     #foot bunion  Patient has bunion present on R foot which has been present for years and is causing the patient pain. Was treated with tylenol inpatient. Patient states he takes medication for th epain outside of the hospital but is not able to recall which medication.     #Polysubstance abuse.  Plan: -Plan as above  - can consider psych consult in AM.      Problem/Plan - 3:  ·  Problem: Alcohol withdrawal.  Plan: Endorses drinking 1-2 pints beer daily. No evidence of alcoholic cirrhosis on exam. Elevated BAL but does not appear to be acutely withdrawing.   - C/w high risk CIWA protocol   - hold on librium for now  - c/w ativan 2mg IV PRN for CIWA >8  - thiamine, folate and MV.      Problem/Plan - 4:  ·  Problem: Hypotension.  Plan: BP 92/53 in ED. likely 2/2 to drug intoxication and dehydration. Now s/p 2L IVF with improvement in BP to 105/70.      Problem/Plan - 5:  ·  Problem: High anion gap metabolic acidosis.  Plan: Patient with elevated anion gap metabolic acidosis on admission labs suspect likely alcoholic/starvation ketosis. Less likely DKA as no hx of DM. Lactate WNL. No s/p IVF.  - f/u repeat CMP  - f/u beta-hydroxy butyrate  - advanced diet as tolerated  - f/u A1c.      Problem/Plan - 6:  Problem: Thrombocytopenia. Plan: Patient with hx of thrombocytopenia. Likely 2/2 to drug abuse/alcohol. No evidence of bleeding or bruising on exam  - trend CBCs.     Problem/Plan - 7:  ·  Problem: Subdural hematoma.  Plan: Hx of sub-dural hematoma s/p craniotomy several years ago at [facility]. With chronic R sided deficits. Neuro exam as above-ambulates with cane at baseline.  - PT eval in AM.      Problem/Plan - 8:  ·  Problem: Hyperglycemia.  Plan: CMP with elevated glucose. No hx of DM. Resolved  - f/u a1c  - f/u beta hydrxoy  - mISS as needed-->can hold for now  - carb consistent diet.     Inpatient treatment course:   New medications:   Labs to be followed outpatient:   Exam to be followed outpatient:    #Discharge: do not delete    Patient is 54 yo M with past medical history of polysubstance abuse (Weed, crack, cocaine, cigarettes and alcohol-approximately 1-2pints daily?), and CVA (traumatic brain injury 14 years ago-subdural hematoma and craniotomy Tri) c residual deficits  Presented with altered mental status, found to have overdose from polysubstance  Problem List/Main Diagnoses (system-based):     #Altered mental status associated with intoxication  Patient presented with AMS in setting of acute polysubstance use with +UTOX for benzodiazepines, cocaine, and alcohol. CT head was negative for acute neuro event. Narcan was given and patient responded. Patient now awake and alert protecting airway and endorsing last use earlier today. Low suspicion for metabolic or infectious cause of AMS as afebrile and no leukocytosis, CTH negative for acute neuro event. Patient likely altered in setting of polysubstance use. CIWA protocol was initiated however patient was not actively withdrawing.  - f/u with PCP as outpatient, counseling on substance use disorder.     #Polysubstance abuse  Patient has history of polysubstance use disorder was found to have utox positive for benzodiazepines, cocaine, and THC. Overdose was likely the cause of the admission as per above.     - plan as above    #foot bunion  Patient has bunion present on R foot which has been present for years and is causing the patient pain. Was treated with tylenol inpatient. Patient states he takes medication for the pain outside of the hospital but is not able to recall which medication.   -f/u with podiatry as outpatient    #High anion gap metabolic acidosis  Patient initially presented with elevated anion gap metabolic acidosis on admission labs, likely due to alcoholic/starvation ketosis. Patient was treated with fluids and the gap as well as the acidosis resolved.   - no additional workup necessary    #Thrombocytopenia  Patient with hx of thrombocytopenia. Likely 2/2 to drug abuse/alcohol. No evidence of bleeding or bruising on exam  - no additional workup necessary    Inpatient treatment course: see above     #Discharge: do not delete    Patient is 54 yo M with past medical history of polysubstance abuse (Weed, crack, cocaine, cigarettes and alcohol-approximately 1-2pints daily?), and CVA (traumatic brain injury 14 years ago-subdural hematoma and craniotomy Tri) c residual deficits  Presented with altered mental status, found to have overdose from polysubstance  Problem List/Main Diagnoses (system-based):     #Altered mental status associated with intoxication  Patient presented with AMS in setting of acute polysubstance use with +UTOX for benzodiazepines, cocaine, and alcohol. CT head was negative for acute neuro event. Narcan was given and patient responded. Patient now awake and alert protecting airway and endorsing last use earlier today. Low suspicion for metabolic or infectious cause of AMS as afebrile and no leukocytosis, CTH negative for acute neuro event. Patient likely altered in setting of polysubstance use. CIWA protocol was initiated however patient was not actively withdrawing.  - f/u with PCP as outpatient, counseling on substance use disorder.     #Polysubstance abuse  Patient has history of polysubstance use disorder was found to have utox positive for benzodiazepines, cocaine, and THC. Overdose was likely the cause of the admission as per above.     - plan as above    #foot bunion  Patient has bunion present on R foot which has been present for years and is causing the patient pain. Was treated with tylenol inpatient. Patient states he takes medication for the pain outside of the hospital but is not able to recall which medication.   -f/u with podiatry as outpatient    #High anion gap metabolic acidosis  Patient initially presented with elevated anion gap metabolic acidosis on admission labs, likely due to alcoholic/starvation ketosis. Patient was treated with fluids and the gap as well as the acidosis resolved.   - no additional workup necessary    #Thrombocytopenia  Patient with hx of thrombocytopenia. Likely 2/2 to drug abuse/alcohol. No evidence of bleeding or bruising on exam  - no additional workup necessary    Inpatient treatment course: see above

## 2021-01-07 DIAGNOSIS — F10.129 ALCOHOL ABUSE WITH INTOXICATION, UNSPECIFIED: ICD-10-CM

## 2021-01-07 DIAGNOSIS — F14.129 COCAINE ABUSE WITH INTOXICATION, UNSPECIFIED: ICD-10-CM

## 2021-01-07 DIAGNOSIS — E87.2 ACIDOSIS: ICD-10-CM

## 2021-01-07 DIAGNOSIS — Z87.820 PERSONAL HISTORY OF TRAUMATIC BRAIN INJURY: ICD-10-CM

## 2021-01-07 DIAGNOSIS — R73.9 HYPERGLYCEMIA, UNSPECIFIED: ICD-10-CM

## 2021-01-07 DIAGNOSIS — F19.129 OTHER PSYCHOACTIVE SUBSTANCE ABUSE WITH INTOXICATION, UNSPECIFIED: ICD-10-CM

## 2021-01-07 DIAGNOSIS — M21.611 BUNION OF RIGHT FOOT: ICD-10-CM

## 2021-01-07 DIAGNOSIS — R26.81 UNSTEADINESS ON FEET: ICD-10-CM

## 2021-01-07 DIAGNOSIS — D69.6 THROMBOCYTOPENIA, UNSPECIFIED: ICD-10-CM

## 2021-01-07 DIAGNOSIS — F12.129 CANNABIS ABUSE WITH INTOXICATION, UNSPECIFIED: ICD-10-CM

## 2021-01-07 DIAGNOSIS — D69.59 OTHER SECONDARY THROMBOCYTOPENIA: ICD-10-CM

## 2021-01-07 DIAGNOSIS — E88.89 OTHER SPECIFIED METABOLIC DISORDERS: ICD-10-CM

## 2021-01-07 DIAGNOSIS — E86.0 DEHYDRATION: ICD-10-CM

## 2021-01-15 NOTE — ED PROVIDER NOTE - NS ED MD EM SELECTION
No sonographic evidence of malignancy  Has benign 4 mm cyst left breast  Annual sceening recommended [id number] Comprehensive

## 2021-01-21 PROCEDURE — 84100 ASSAY OF PHOSPHORUS: CPT

## 2021-01-21 PROCEDURE — 36415 COLL VENOUS BLD VENIPUNCTURE: CPT

## 2021-01-21 PROCEDURE — 80053 COMPREHEN METABOLIC PANEL: CPT

## 2021-01-21 PROCEDURE — 83735 ASSAY OF MAGNESIUM: CPT

## 2021-01-21 PROCEDURE — U0005: CPT

## 2021-01-21 PROCEDURE — 83036 HEMOGLOBIN GLYCOSYLATED A1C: CPT

## 2021-01-21 PROCEDURE — 86850 RBC ANTIBODY SCREEN: CPT

## 2021-01-21 PROCEDURE — 97161 PT EVAL LOW COMPLEX 20 MIN: CPT

## 2021-01-21 PROCEDURE — 96374 THER/PROPH/DIAG INJ IV PUSH: CPT

## 2021-01-21 PROCEDURE — U0003: CPT

## 2021-01-21 PROCEDURE — 86900 BLOOD TYPING SEROLOGIC ABO: CPT

## 2021-01-21 PROCEDURE — 80307 DRUG TEST PRSMV CHEM ANLYZR: CPT

## 2021-01-21 PROCEDURE — 85730 THROMBOPLASTIN TIME PARTIAL: CPT

## 2021-01-21 PROCEDURE — 83605 ASSAY OF LACTIC ACID: CPT

## 2021-01-21 PROCEDURE — 70450 CT HEAD/BRAIN W/O DYE: CPT

## 2021-01-21 PROCEDURE — 85610 PROTHROMBIN TIME: CPT

## 2021-01-21 PROCEDURE — 82010 KETONE BODYS QUAN: CPT

## 2021-01-21 PROCEDURE — 80048 BASIC METABOLIC PNL TOTAL CA: CPT

## 2021-01-21 PROCEDURE — 99285 EMERGENCY DEPT VISIT HI MDM: CPT | Mod: 25

## 2021-01-21 PROCEDURE — 86901 BLOOD TYPING SEROLOGIC RH(D): CPT

## 2021-01-21 PROCEDURE — 85025 COMPLETE CBC W/AUTO DIFF WBC: CPT

## 2021-01-27 ENCOUNTER — APPOINTMENT (OUTPATIENT)
Dept: INTERNAL MEDICINE | Facility: CLINIC | Age: 56
End: 2021-01-27

## 2022-03-30 NOTE — ED PROVIDER NOTE - AGGRAVATING FACTORS
Physical Therapy    Facility/Department: [facility]  Initial Assessment    NAME: Susan Cueva  : 1950  MRN: [id number]    Date of Service: 3/30/2022    Discharge Recommendations:    Pt currently functioning below baseline. Would suggest additional therapy at time of discharge to maximize long term outcomes and prevent re-admission. Please refer to AM-PAC score for current level of function. Susan Cueva is a 71 y.o.  female who presents with Extremity Weakness     Patient admitted through the emergency room where she presented via EMS. Patient had recently been discharged from the hospital for acute rhabdomyolysis. Patient had been recommended to go to a skilled nursing facility for rehabilitation. However in spite of repeated recommendations patient refused to go to a rehab center. Social work had made a call to [facility] in addition in view of the patient's condition.     Apparently the patient was discharged 3 days ago and says since then she has been sitting in one chair. She says she has not been able to get up and has developed a decubitus ulcer. She is asking for pain medication for that which has been ordered. Patient's muscle enzymes have been unremarkable now       Assessment   Body structures, Functions, Activity limitations: Decreased functional mobility ; Decreased safe awareness;Decreased balance;Decreased ROM; Decreased strength;Decreased endurance; Increased pain;Decreased cognition  Assessment: Pt. readmitted from last week where she discharged home and sat in a chair for 3 days, not getting up once. Had no food. Pt. does not know who called 911 as she lives alone. She is now recognizing that she cannot function indep. in her home (lives alone.)  During eval, pt. required range of assist from [place] A at times to Mod A x 2. Will progress as able.   Specific instructions for Next Treatment: progress OOB mobility  Prognosis: Good  Decision Making: High Complexity  PT Education: Goals;PT Role;Plan of Care;General Safety  Barriers to Learning: questionable cognition  REQUIRES PT FOLLOW UP: Yes  Activity Tolerance  Activity Tolerance: Patient limited by cognitive status; Patient limited by pain       Patient Diagnosis(es): The encounter diagnosis was Generalized weakness. has a past medical history of AAA (abdominal aortic aneurysm) (HCC), Acid reflux, Anxiety and depression, Aortic stenosis, Arthritis, Blister of ankle, right, CAD (coronary artery disease), Cancer ([facility] [address] COPD (chronic obstructive pulmonary disease) ([facility] [address] Diabetes mellitus ([facility] [address] Falls, Heart block, Hypokalemia, MDRO (multiple drug resistant organisms) resistance, MRSA (methicillin resistant staph aureus) culture positive, On home oxygen therapy, On home oxygen therapy, Overactive bladder, Pneumonia, PONV (postoperative nausea and vomiting), and Vitamin D deficiency. has a past surgical history that includes back surgery; eye surgery; Cholecystectomy; Appendectomy; Hysterectomy; Tonsillectomy; lumbar laminectomy; Endoscopy, colon, diagnostic (12/08/2016); aortic valve repair (N/A, 4/11/2017); bronchoscopy (N/A, 8/31/2020); IR INSERT PICC VAD W SQ PORT >5 YEARS (9/4/2020); and IR PORT PLACEMENT > 5 YEARS (9/29/2020).     Restrictions  Restrictions/Precautions  Restrictions/Precautions: General Precautions,Fall Risk  Position Activity Restriction  Other position/activity restrictions: L UE IV, purewick  Vision/Hearing        Subjective  General  Patient assessed for rehabilitation services?: Yes  Pain Screening  Patient Currently in Pain: Yes          Orientation     Social/Functional History  Social/Functional History  Lives With: Alone  Type of Home: House  Home Layout: Two level,Able to Live on Main level with bedroom/bathroom  Home Access: Ramped entrance  Bathroom Shower/Tub: Tub/Shower unit  [place] Equipment: Tub transfer bench  Home Equipment: Rolling walker  ADL Assistance: Needs assistance (HHA 3x/week, assist for showers)  Homemaking Assistance: Needs assistance  Ambulation Assistance: Independent (RW)  Transfer Assistance: Independent  Active : No  Patient's  Info: granddaughter  Additional Comments: pt was just her, home for 3 days and couldn't get out of chair entire time home. Did not eat or take any meds. Pt readmitted and is now agreeable to SNF. Pt is a questionable historian with regards to recent timeline of events. Pt does not know details of how she got home the last time, why she sat there for 3 days without eating or calling for help. Does report h.o. falls but states it has been a while (despite being found on floor prior to last admission). Also has scab on side of face by glasses that pt cannot fully explain. Cognition   Cognition  Overall Cognitive Status: Exceptions  Safety Judgement: Decreased awareness of need for assistance;Decreased awareness of need for safety  Problem Solving: Assistance required to generate solutions;Assistance required to identify errors made  Insights: Decreased awareness of deficits  Initiation: Requires cues for some    Objective          AROM RLE (degrees)  RLE AROM: WFL  RLE General AROM: painful through functional range; moves slowly due to pain  (states it \"hurts everywhere\")  AROM LLE (degrees)  LLE AROM : WFL  Strength RLE  Strength RLE: WFL  Strength LLE  Strength LLE: WFL     Sensation  Overall Sensation Status: WFL  Bed mobility  Supine to Sit: Minimal assistance  Comment: up to chair after gait  Transfers  Sit to Stand: Minimal Assistance (at times pt. min A x 1 to stand;  required mod A x2 to stand from toilet after being up for awhile. Amb. to bathroom but needed FARHANA MACIEL back to chair.)  Stand to sit: Minimal Assistance; Moderate Assistance  Ambulation  Ambulation?: Yes  Ambulation 1  Surface: level tile  Device: Rolling Walker  Assistance: Minimal assistance  Quality of Gait: Pt able to amb. to bathroom with RW, 4L O2 and IV, min A. After toileting, however, pt. dizzy and weak, needing FARHANA STEDY to return to chair at bedside. Distance: 20ft. Comments: Toilet transfer min A to get on and mod A x2 to get off. Plan   Plan  Times per week: 1-2x/day; 5-6days/wk  Specific instructions for Next Treatment: progress OOB mobility  Current Treatment Recommendations: Strengthening,Transfer Training,ROM,Balance Training,Functional Mobility Training,Gait Training,Safety Education & [employer] Exercise Program,Patient/Caregiver Education & Training,Endurance Training  Safety Devices  Type of devices: All fall risk precautions in place,Gait belt,Patient at risk for falls,Call light within reach,Nurse notified,Left in chair,Chair alarm in place    G-Code       OutComes Score                                                  AM-PAC Score  AM-PAC Inpatient Mobility Raw Score : 14 (03/30/22 1319)  AM-PAC Inpatient T-Scale Score : 38.1 (03/30/22 1319)  Mobility Inpatient CMS 0-100% Score: 61.29 (03/30/22 1319)  Mobility Inpatient CMS G-Code Modifier : CL (03/30/22 1319)          Goals  Short term goals  Time Frame for Short term goals: 12 visits:  Short term goal 1: Pt. to be SBA for bed mob. Short term goal 2: Pt. to require CGA for sit to stand tranfers from all surface heights with safe hand placement. Short term goal 3: Pt. to require CGA to amb. 25ft. with RW and approp. O2. Short term goal 4: Pt. to tolerate 25+ min. of PT daily for ther ex/ gait/ balance training  Patient Goals   Patient goals : d/c to SNF       Therapy Time   Individual Concurrent Group Co-treatment   Time In 5564         Time Out 1157         Minutes 59              Treatment time:  49min . Co-treatment with OT warranted secondary to decreased safety and independence requiring 2 skilled therapy professionals to address individual discipline's goals.     Vanesa Benavides, PT none

## 2023-04-04 NOTE — ED PROVIDER NOTE - ENMT NEGATIVE STATEMENT, MLM
Ears: no ear pain and no hearing problems.Nose: no nasal congestion and no nasal drainage.Mouth/Throat: no dysphagia, no hoarseness and no throat pain.Neck: no lumps, no pain, no stiffness and no swollen glands. Her/She

## 2023-06-07 NOTE — ED PROVIDER NOTE - CARDIAC, MLM
What Type Of Note Output Would You Prefer (Optional)?: Standard Output How Severe Is Your Skin Lesion?: moderate Has Your Skin Lesion Been Treated?: not been treated Is This A New Presentation, Or A Follow-Up?: Skin Lesion Normal rate, regular rhythm.  Heart sounds S1, S2.  No murmurs, rubs or gallops. no tachycardia

## 2023-09-02 NOTE — H&P ADULT - PROBLEM SELECTOR PLAN 4
oral BP 92/53 in ED. likely 2/2 to drug intoxication and dehydration. Now s/p 2L IVF with improvement in BP to 105/70.

## 2023-09-15 NOTE — H&P ADULT - PROBLEM SELECTOR PROBLEM 4
High anion gap metabolic acidosis Quality 226: Preventive Care And Screening: Tobacco Use: Screening And Cessation Intervention: Patient screened for tobacco use and is an ex/non-smoker Detail Level: Detailed Hypotension

## 2024-05-10 NOTE — PATIENT PROFILE ADULT - NSTOBACCO QUIT READY_GEN_A_CORE_SD
Contacted patient in regards of lab results, verified . Patient verbalizes understanding and denies any further questions or concerns.   Pt has been out of the thyroid medication, started medication back this week.      not motivated to quit

## 2025-01-20 NOTE — PATIENT PROFILE ADULT - MEDICATIONS/VISITS
ONCOLOGY/HEMATOLOGY VIRTUAL FOLLOW-UP    I confirmed the patient's identity and they consented to a telemedicine encounter billable to insurance.    CHIEF COMPLAINT:   Ms. 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    HISTORY OF PRESENT ILLNESS:   Porsha Yao is a pleasant 75 year old  female non-smoker with hypertension, gastroesophageal reflux disease, and diabetes mellitus type 2 who presented with headache and abdominal pain on 17.      CT scan of the head without contrast on 17 was negative for any acute intracranial abnormality including hemorrhage. CT scan of the abdomen and pelvis on 17 showed a new 6.9 x 9.9 x 7.1 cm cystic and solid pelvic mass lesion. Pelvic ultrasound on 17 described a 7.8 x 7.0 x 11.1 cm heterogeneous hypoechoic irregular mass with cystic and solid components in the pelvis with moderate associated internal color Doppler flow, with arterial and venous waveforms. Pap smear on 17 was negative. Vaginal cuff biopsy on 17 was negative. Esophagogastroduodenoscopy on 17 revealed Helicobacter pylori gastritis.    She was managed by Dr. Sia Rich in Gyn Oncology. On 17, the patient underwent laparoscopic bilateral salpingo-oophorectomy, omental biopsy, pelvic lymph node sampling and cystoscopy. 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     The disease was observed and she was then lost to follow-up until presenting to the ED on 20 with abdominal pain. CT scan of the abdomen and pelvis with contrast on 20 showed a large, 7.1 x 7.0 x 7.8 cm heterogenous mass within the pelvis causing mass effect and displacement on the bladder, a suspicious, 13 mm nodule along the right abdominal wall  closely associated with the rectus musculature, and wall thickening involving the distal stomach, duodenum, and proximal right colon. CEA, CA-125, and CA 19-9 were all normal on 8/30/20. At the time of follow-up with Dr. Sigala on 9/4/20, the patient deferred surgery but agreed with GI evaluation. Esophagogastroduodenoscopy on 9/29/20 showed mild chronic antral gastritis. Colonoscopy on 9/29/20 showed a tubular adenoma and a sessile serrated adenoma.    She was seen in the ED again with CT scan of the abdomen and pelvis with contrast on 1/6/21 revealing an early small bowel obstruction, though the previous 8 cm mass was not seen there was a 3 cm cystic mass in the right pelvis. On 1/12/21, Dr. Ever Hook and Dr. Guanakito Jernigan performed radical tumor debulking including complete omentectomy, peritoneal stripping and removal of a right-sided large pelvic mass intimate with the bowel including removal of plaque-like tumors along the bowel mesentery, and resection of greater than 5 cm of anterior abdominal wall tumor intimately involving the overlying fascia, rectus muscle and intimate with the epigastrics. Dr. Otoniel Wilcox placed bilateral ureteral stents. Surgical pathology confirmed recurrent metastatic granulosa cell tumor in the small bowel, right pelvic mass, and additional abdominal tumor with benign anterior abdominal wall.    CT angiography of the thorax on 1/13/21 was negative for pulmonary embolism but showed new focal tree-in-bud nodularity in the left upper lobe. Recommendation from Gyn Case Conference on 1/20/21 was for 6 cycles of carboplatin-paclitaxel. CT scan of the abdomen and pelvis with contrast on 3/26/21 showed interval resection of the right pelvic mass but two enlarging soft tissue nodules in the lower anterior abdominal wall. In order to preserve quality of life she deferred platinum-taxane doublet chemotherapy but after right internal jugular vein bio [employer] power port placement on 4/7/21 she  completed 6 cycles of single-agent carboplatin (AUC=6) from 4/9/21 to 8/13/21.    CT scans of the abdomen and pelvis with contrast on 5/12/21 and 7/6/21 showed persistent disease but CT scan of the chest, abdomen, and pelvis with contrast on 8/10/21 was negative. CT scan of the chest, abdomen, and pelvis with contrast on 11/18/21 showed stable soft tissue nodules in the anterior abdominal wall measuring up to 21 mm. CT scan of the chest, abdomen, and pelvis with contrast on 2/10/22 showed stable tiny lung nodules but mild growth of the soft tissue nodule in the right anterior abdominal wall to 2.3 cm (from 2.1 cm). CT scan of the chest, abdomen, and pelvis with contrast on 12/1/22 showed stable 2 and 3 mm lung nodules, a stable 2.2 cm right thyroid nodule, a new 11 x 8 mm oval nodular density in the right lower quadrant, a new 2.5 x 1.2 cm right hepatic dome lesion, and interval growth of the right lower abdominal wall soft tissue mass to 2.9 x 2.5 cm (from 2.5 x 2.0 cm) and a smaller left lower abdominal wall soft tissue nodule to 2.0 x 1.2 cm (from 1.0 x 0.7 cm).     After initially agreeing to pursue bevacizumab, the patient and her daughter requested change in treatment plan to letrozole, which she started on 1/18/23. Baseline bone density scan on 1/30/23 showed osteoporosis for which she was prescribed weekly alendronate.    CT scan of the chest, abdomen, and pelvis with contrast on 3/14/23 showed slight interval growth of a right paracolic gutter nodule to 12 x 10 mm (from 11 x 8 mm), an unchanged 7 mm mesenteric nodule, and interval growth of anterior abdominal wall subcutaneous nodules measuring up to 3.4 x 2.6 cm (from 2.9 x 2.5 cm).    Thyroid ultrasound on 4/5/23 showed multiple nodules but none meeting criteria for biopsy or surveillance.     CT scan of the chest, abdomen, and pelvis with contrast on 5/30/23 showed stable liver lesions measuring 2.3 x 1.2 cm and 14 mm, similar size of peritoneal nodules  including at the inferior hepatic margin to 14 x 12 mm (from 14 x 11 mm), at the cecum to 4 mm (from 5 mm) and 10 x 6 mm (from 8 x 6 mm), and another to 8.3 x 6.5 mm (from 8.3 x 5.9 mm) but interval shrinkage of abdominal wall masses to 3.2 x 2.2 cm on the right (from 3.2 x 2.4 cm) and 2.1 x 1.2 cm on the left (from 2.2 x 1.3 cm).    CT scan of the chest, abdomen, and pelvis with contrast on 9/25/23 showed slight interval shrinkage of abdominal wall, hepatic capsule, and right paracolic gutter lesions with no new metastatic disease. Interventional Radiology removed her port by request on 12/1/23.    The patient was seen in the ED on 12/26/23 with vomiting. CT scan of the abdomen and pelvis with contrast on 12/26/23 showed stable circumscribed nodule/masses in the bilateral ventral abdominal wall measuring up to 3 cm on the right and 2 cm on the left.    CT scan of the chest, abdomen, and pelvis with contrast on 5/21/24 showed stability of a tiny parenchymal nodule anteriorly in the right upper lobe, a 2.4 cm right thyroid nodule, a 2.4 cm x 1 cm low-density lesion posteriorly near the dome of the right lobe of the liver, a 0.68 cm right lower quadrant nodule, right lower quadrant anastomosis, and the 1.8 cm x 1 cm left anterior abdominal wall mass with interval shrinkage of a right paracolic gutter lymph node but slight growth of the right lower quadrant anterior abdominal wall mass to 4.8 cm x 3.4 cm.    CT scan of the chest, abdomen, and pelvis with contrast on 9/9/24 showed an apparently new poorly marginated 13 mm hypoenhancing lesion in the posterolateral periphery of segment 7 of the liver, interval resolution of a previous 16 mm soft tissue nodule along the inferior margin of the right lobe of the liver, interval shrinkage of the capsular-subcapsular lesion along the posterior right lobe in segment 7 to 8 x 25 mm (from 12 x 27 mm), similar appearance of a tiny 6 mm subtle finding anteriorly in the left lobe  of the liver, unchanged mediastinal nodes, stability of a 35 x 25 mm right lower deep subcutaneous anterior abdominal wall fat nodule, and interval shrinkage of a left lower abdominal wall fat nodule to 7 x 17 mm (from 12 x 21 mm).     MRI of the liver with and without contrast on 9/30/24 showed 2 mildly T2 hyperintense lesions in segment 7 of the liver with associated enhancement characteristics and diffusion restriction measuring up to 2.4 cm. On 10/23/24, we recommended bevacizumab but the patient requested time to discuss with her daughter, who later agreed. She then began bevacizumab on 10/30/24. Colonoscopy on 11/13/24 showed moderate internal hemorrhoids and two tubular adenomas.    She continued bevacizumab with cycle 2 on 11/20/24 and cycle 3 on 12/18/24. After cycle 4 of bevacizumab on 1/8/25, CT scan of the chest, abdomen, and pelvis with contrast on 1/10/25 showed interval enlargement of the right thyroid mass to 4 cm (from 3.5 cm), a right lower quadrant anterior abdominal wall deep subcutaneous fat nodule to 6 cm (from 5.1 cm), an additional left abdominal wall nodule centered in the left abdominal oblique musculature to 20 x 9 mm (from 15 x 5 mm), a left pelvic lymph node along the external iliac chain to 11 mm (from 7 mm), a left anterior lobe capsular-based lesion or peritoneal implant at midline to 10 x 5 mm (from 6 x 4 mm), another capsular-based lesion along the posterior liver dome to 30 x 15 mm (from 8 x 25 mm), and the lesion slightly deeper to the capsule in the right lateral aspect of segment 6 to 15 mm (from 13 mm).    The patient presents for a telephone follow-up. ECOG performance status is now 2. Since last seen the patient and her son report diffuse pains in both shoulders as well as bilateral lower extremity numbness and gait instability. She denies any bleeding diathesis including hematochezia, melena, hematuria, epistaxis, hematemesis, hemoptysis, or gingival bleeding. She also  denies any recent fever, chills, sweats, chest pain, shortness of breath, dyspnea on exertion, dizziness, lightheadedness, palpitations, focal weakness, vision change, or confusion. There is no cough, sputum production, diarrhea, or constipation.    Past medical history, family history, social history, allergies and medications have all been reviewed as documented in the Epic system, and I agree with them and are updated.     REVIEW OF SYSTEMS:   Apart from that described in the review of systems as above in the History of Present Illness, the remainder of the review of systems is documented in the Epic system, and I agree with them.     PHYSICAL EXAMINATION:   Vitals:    There were no vitals taken for this visit.    Not performed (telephone visit)    LABORATORY STUDIES:  Recent Labs   Lab 01/08/25  1049 12/18/24  1019 11/20/24  0956 10/30/24  1232 10/23/24  0837   WBC 8.4 6.6 5.5 5.5 6.8   RBC 4.61 4.24 4.36 4.23 4.50   HGB 13.3 12.3 12.3 12.2 12.6   HCT 40.2 37.6 37.8 36.9 39.3   MCV 87.2 88.7 86.7 87.2 87.3    163 133* 150 155   Absolute Neutrophils 4.8 4.1 2.2 2.1 2.8   Absolute Lymphocytes 1.7 1.4 1.5 1.5 1.5   Absolute Monocytes 0.7 0.4 0.4 0.4 0.4   Absolute Eosinophils  1.1* 0.7* 1.3* 1.5* 2.0*   Absolute Basophils 0.0 0.0 0.0 0.0 0.0     Recent Labs   Lab 01/08/25  1049 12/18/24  1019 11/20/24  0956 10/30/24  1232 10/23/24  0837   Glucose 108* 129* 110* 148* 91   Sodium 141 142 142 141 139   Potassium 3.8 3.8 3.5 3.5 4.1   Chloride 104 105 105 106 102   BUN 17 20 11 16 12   Creatinine 0.85 0.68 0.62 0.68 0.68   Calcium 8.8 8.3* 8.6 8.6 8.6   Protein, Total 7.8 7.5 7.8 7.5 7.8   Albumin 3.5 3.6 3.7 3.5 3.5   GOT/AST 18 22 23 21 21   Alkaline Phosphatase 97 84 78 90 96   GPT 24 28 24 24 22     Recent Labs   Lab 01/08/25  1049 12/18/24  1019 11/20/24  0956 10/30/24  1232 10/23/24  0837   Anion Gap 12 13 14 14 13   Globulin 4.3* 3.9 4.1* 4.0 4.3*   Bilirubin, Total 0.3 0.5 0.3 0.3 0.3     Cancer Antigen  125 (Units/mL)   Date Value   11/21/2022 <3   02/02/2022 2   08/13/2021 3   07/23/2021 2   06/11/2021 2        RADIOLOGICAL DATA REVIEWED:   CT CHEST ABDOMEN PELVIS W CONTRAST  Result Date: 12/1/2022  IMPRESSION: *  Ill-defined new area of hypoattenuation along the right hepatic dome, which may represent a metastatic implant. Attention on follow-up imaging. *  Increase in size of right anterior abdominal wall and left anterior abdominal wall soft tissue nodules compared to 2/10/2022, suspicious for worsening disease. *  New 11 x 8 mm oval soft tissue nodule in the right lower quadrant (4/65), and 7 mm anterior right enteric nodule (4/73) are indeterminant. Metastatic disease is suspected. *  No convincing evidence metastatic disease in the chest.     BD Dexa Axial Skeleton  Result Date: 1/30/2023  IMPRESSION: 1. Osteoporosis.     CT CHEST ABDOMEN PELVIS W CONTRAST  Result Date: 3/14/2023  IMPRESSION: No acute abnormality. Mild increase in size of paracolic gutter and subcutaneous metastases. Unchanged hypoattenuating region within the liver. 2.5 cm right thyroid nodule partially visualized. Recommend further evaluation with thyroid ultrasound if this has not been performed.     CT CHEST ABDOMEN PELVIS W CONTRAST  Result Date: 6/2/2023  IMPRESSION: 1. Stable surgical changes of hysterectomy and right colon resection with no evidence for local recurrence. 2. There is a slight increase in size to a soft tissue nodule near the cecum with other soft tissue nodules within the peritoneum and anterior abdominal wall stable to slightly smaller, as detailed above. 3. Dominant mass at the hepatic dome is stable. A second lesion in the inferior right hepatic lobe is slightly better seen than on previous studies. Recommend close follow-up 4. Resolution of the trace ascites seen previously 5. No evidence for new metastatic disease in the chest. Stable presumed inflammatory changes. 6. Question wall thickening of the mid ascending  colon. This clinically correlate regarding patient's colonoscopy status. 7. Additional chronic findings as above.      CT CHEST ABDOMEN PELVIS W CONTRAST  Result Date: 10/1/2023  IMPRESSION: 1.   Slight decrease in size of the abdominal wall, hepatic capsule, and right paracolic gutter lesions. 2.   No new metastatic disease.     CT ABDOMEN PELVIS W CONTRAST  Result Date: 12/26/2023  IMPRESSION: *   No acute abdominopelvic findings, including no free air, free fluid, abscess or bowel obstruction.. *   No significant change in ventral abdominal wall masses and right paracolic nodule.  Right hepatic subcapsular lesion no longer clearly visualized/assessed.  No convincing new or progressive metastatic disease. *   Postsurgical changes as described.     CT CHEST ABDOMEN PELVIS W CONTRAST  Result Date: 5/30/2024  IMPRESSION: Soft tissue density mass in the anterior abdominal wall of the right lower quadrant is slightly increased in size. Left lower quadrant anterior abdominal wall mass not significantly changed. Low-density lesion posteriorly in the right lobe of the liver is not significantly changed. There is a right paracolic gutter lymph node or nodule that is slightly decreased in size. No evidence of progression of metastatic disease in the chest. Stable right thyroid mass.    CT CHEST ABDOMEN PELVIS W CONTRAST  Result Date: 9/12/2024  IMPRESSION: -Status post EMMANUEL/BSO and partial bowel resection, in this patient with a history of ovarian granulosa cell tumor. -Apparently new suspicious small right lobe liver lesion, suspected metastasis. The nodularity along the very inferoposterior right hepatic lobe is apparently resolved. Other capsular or subcapsular based liver lesion/peritoneal metastases were present on prior, the one along the anterior left lobe appears modestly larger. -Bilateral anterior lower quadrant anterior abdominal wall soft tissue nodules; no substantial change in the right, intervally smaller on  the left. -No other/new metastatic disease identified elsewhere otherwise throughout the chest, abdomen, or pelvis.     MRI LIVER W WO CONTRAST  Result Date: 10/8/2024  IMPRESSION: 1.  Motion degradation limits evaluation. 2.  There are 2 mildly T2 hyperintense lesions in segment 7 of the liver with associated enhancement characteristics and diffusion restriction suggestive of metastatic disease with the largest measuring up to 2.4 cm in diameter as above. An additional small 7 mm subcapsular T2 hyperintense nodule with associated diffusion restriction along the anterior left hepatic lobe is suspicious for an additional site of metastatic disease. 3.  Redemonstrated partially evaluated bilateral anterior abdominal wall enhancing subcutaneous lesions, present on CT dating back at least to November 2021, slowly enlarging and compatible with metastasis. 4.  No upper abdominal lymphadenopathy.     CT CHEST ABDOMEN PELVIS W CONTRAST  Result Date: 1/16/2025  EXAM: CT CHEST ABDOMEN PELVIS W CONTRAST CLINICAL INDICATION:  75F with granulosa cell tumor of L ovary on letrozole with tender lower abdominal masses (left > right) now on bevacizumab x 4 cycles COMPARISON: CT 9/9/2024 and 9/25/2023. MRI abdomen 9/30/2024. TECHNIQUE: Helically-acquired CT of the chest, abdomen, and pelvis was obtained after the uncomplicated bolused intravenous administration of  100 mL of Omnipaque 300. Additionally, negative oral/enteric contrast (water) was administered. Multiplanar & MIP reformatted images were rendered. FINDINGS: LUNGS & PLEURA: Micronodule in each upper lobe (5/176, 5/90) are unchanged. NECK BASE & CHEST/ABD. WALL: Interval enlargement of the now 4 cm right thyroid mass, currently 35 mm (2/10), measuring up to 3 cm on the 9/9/2024 CT. The right lower quadrant anterior abdominal wall centered in the deep subcutaneous fat is intervally larger, currently 6 cm (11/26), 5.1 cm on the 9/30/2024 MRI. A similar smaller nodule on the  left is unchanged sized, 18 mm (11/29). Subtle additional left abdominal wall nodule centered in the left abdominal oblique musculature is larger, now 20 x 9 mm (4/74), measuring 15 x 5 mm on the 5/21/2024 CT. No new abdominal wall nodules or masses elsewhere. VESSELS: Overall, moderate scattered atherosclerotic calcifications. Normal caliber thoracoabdominal aorta. No high-grade arterial stenosis. Normal wide patency of the hepatic and portal venous systems. Normal caliber main pulmonary artery, no large or central pulmonary arterial embolism. Normal wide patency of the hepatic and portal venous systems. LYMPH NODES: Multiple prominent but coarsely calcified mediastinal lymph nodes are unchanged. Rounded 11 mm left pelvic lymph node along the external iliac chain (4/97), only 7 mm on the 5/21/2024 CT comparison. HEART: Normal heart size. No pericardial effusion. MEDIASTINUM: Aside from the calcified mediastinal lymph nodes, no new, acute, or suspicious finding. LIVER: The left anterior lobe capsular-based lesion or peritoneal implant at midline appears modestly thicker in the AP dimension compared with 9/9/2024, currently 10 x 5 mm (4/28), measuring about 6 x 4 mm at that time. The other capsular-based lesion along the posterior liver dome is also larger, currently 30 x 15 mm (4/21), most recently 8 x 25 mm. The previously 13 mm lesion slightly deeper to the capsule in the right lateral aspect of segment 6 may also be modestly larger now at 15 mm (4/33). No new lesion identified. GALLBLADDER & BILE DUCTS: Normal. PANCREAS: Normal. SPLEEN: Normal. ADRENAL GLANDS: Normal. KIDNEYS/COLLECTING SYSTEMS: Normal. BOWEL/PERITONEAL CAVITY: Unremarkable CT appearance of the esophagus, stomach, small bowel, colon, and appendix. Aside from the presumed perihepatic peritoneal implants, no definitive peritoneal nodules/masses or ascites elsewhere. PELVIS: Unremarkable postsurgical changes reflecting EMMANUEL/BSO. No convincing pelvic  nodules identified. Unremarkable urinary bladder. MUSCULOSKELETAL: Bones appear osteopenic/osteoporotic. No acute or suspicious abnormality. Prominent sacral Tarlov cyst versus dural ectasia with associated bony remodeling of the sacrum.   IMPRESSION: -Apparent progression of metastatic disease, in this patient status post EMMANUEL/BSO for history of ovarian carcinoma. -Progression is demonstrated by interval enlargement of a couple of anterior abdominal wall masses, as well as left pelvic/external iliac chain lymph node. There is also been moderate interval enlargement of the peripheral liver lesions or probable peritoneal implants/carcinomatosis. -Interval enlargement of the large right thyroid mass, likely owing to increased cystic or colloid component (better depicted on prior dedicated thyroid ultrasound).       ASSESSMENT:  Porsha Yao is a 75 year old female with the following problems and, after discussion with the patient, we have agreed upon the following plan:    1. Adult granulosa cell tumor of the left ovary  - 2/19/17: CT scan of the abdomen and pelvis showed a new 6.9 x 9.9 x 7.1 cm cystic and solid pelvic mass lesion  - 2/20/17: Pelvic ultrasound described a 7.8 x 7.0 x 11.1 cm heterogeneous hypoechoic irregular mass with cystic and solid components in the pelvis   - 2/20/17: Pap smear and vaginal cuff biopsy were negative  - 2/27/17: Status post laparoscopic bilateral salpingo-oophorectomy, omental biopsy, pelvic lymph node sampling and cystoscopy. 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 measuring approximately 12.5 cm, grade 2 with benign right ovary, right fallopian tube, omentum, and 3 lymph nodes (0/3)  - 8/30/20: CT scan of the abdomen and pelvis with contrast showed a large, 7.1 x 7.0 x 7.8 cm heterogenous mass within the pelvis causing mass effect and displacement on the bladder, a suspicious, 13 mm nodule along the right abdominal wall  closely associated with the rectus musculature, and wall thickening involving the distal stomach, duodenum, and proximal right colon  - 1/6/21: CT scan of the abdomen and pelvis revealed an early small bowel obstruction  - 1/12/21: Status post radical tumor debulking including complete omentectomy, peritoneal stripping and removal of a right-sided large pelvic mass intimate with the bowel including removal of plaque-like tumors along the bowel mesentery, and resection of greater than 5 cm of anterior abdominal wall tumor intimately involving the overlying fascia, rectus muscle and intimate with the epigastrics. Surgical pathology confirmed recurrent metastatic granulosa cell tumor in the small bowel, right pelvic mass, and additional abdominal tumor with benign anterior abdominal wall  - 1/20/21: Discussed at Gyn Case Conference   - 3/26/21: CT scan of the abdomen and pelvis showed interval resection of the right pelvic mass but two enlarging soft tissue nodules in the lower anterior abdominal wall  - 4/7/21: Status post right-sided port placement   - 4/9/21 to 8/13/21: Status post carboplatin x 6 cycles   - 8/10/21: CT scan of the chest, abdomen, and pelvis was negative  - 11/18/21: CT scan of the chest, abdomen, and pelvis showed stable soft tissue nodules in the anterior abdominal wall measuring up to 21 mm  - 2/10/22: CT scan of the chest, abdomen, and pelvis showed mild growth of the soft tissue nodule in the right anterior abdominal wall to 2.3 cm (from 2.1 cm)  - 11/21/22: CA-125 was undetectable  - 12/1/22: CT scan of the chest, abdomen, and pelvis showed a new 11 x 8 mm oval nodular density in the right lower quadrant, a new 2.5 x 1.2 cm right hepatic dome lesion, and interval growth of the right lower abdominal wall soft tissue mass to 2.9 x 2.5 cm (from 2.5 x 2.0 cm) and a smaller left lower abdominal wall soft tissue nodule to 2.0 x 1.2 cm (from 1.0 x 0.7 cm)  - 1/18/23: Began letrozole   - 3/14/23: CT  scan showed interval growth of a right paracolic gutter nodule to 12 x 10 mm (from 11 x 8 mm) and a couple of anterior abdominal wall subcutaneous nodules up to 3.4 x 2.6 cm (from 2.9 x 2.5 cm)  - 4/5/23: Thyroid ultrasound showed multiple nodules but none meeting criteria for biopsy or surveillance  - 5/30/23: CT scan showed stable to improved liver lesions, peritoneal nodules, and abdominal wall masses  - 9/25/23: CT scan showed partial response  - 12/1/23: Status post port removal  - 12/26/23: CT scan was grossly stable  - 5/21/24: CT scan showed mixed response  - 9/9/24: CT scan showed stable to improved subcutaneous abdominal wall lesions but mixed changes in liver lesions  - 9/30/24: Liver MRI showed disease progression  - 10/30/24: Cycle 1 bevacizumab   - 11/20/24: Cycle 2 bevacizumab   - 12/18/24: Cycle 3 bevacizumab   - 1/8/25: Cycle 4 bevacizumab   - 1/10/25: CT scan showed multifocal progression    2. Medical problems including hypertension, gastroesophageal reflux disease, diabetes mellitus type 2, colon polyps (tubular adenoma and sessile serrated adenoma on 9/29/20, tubular adenoma x 2 on 11/13/24), and osteoporosis (bone density scan 1/30/23)      PLAN:  1. Labs, imaging, and pathology were personally reviewed with the patient.  2. 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 Cytotoxic chemotherapy was deferred initially by the patient and her family in order to optimize quality of life. The subcutaneous abdominal wall foci of disease showed radiologic response to hormonal therapy but the disease progressed in the liver and has now progressed despite bevacizumab.    3. She agrees to discontinue bevacizumab.  4. Treatment options include:   - alternative hormonal therapy using exemestane 25 mg PO daily, extrapolated per [[PARAGON/ANZGOG 0903 trial [Gynecol Oncol 2021 Oct;163(1):72-78]]  - If folate receptor  alpha (FR?) testing comes back positive will request insurance pre-authorization for mirvetuximab soravtansine-gynx (Elahere), per Monika Oncol 2021 Jun;32(6):757-765 and N Engl J Med 2023; [id number]  - carboplatin with or without docetaxel preferred by [facility] for malignant sex cord stromal tumors. Newly diagnosed patients treated with BEP vs. taxanes had no significant difference in response rate (Solomon's exact test, P = 1), progression-free survival (PFS) (log-rank test, P = 0.213), or overall survival (log-rank test, P = 0.994). An [place] [facility] [place][id number]) is active to compare these two combinations, but is not currently accruing patients. Inclusion criteria require at least stage IIA disease.  - BEP (bleomycin, etoposide, cisplatin) for 3 cycles (or 4 cycles for high-risk disease) is the current treatment of choice for malignant germ cell tumors [per Gynecol Oncol 1999;72:131-137 and Gynecol Oncol 2012 Apr;125(1):80-6] with category 2B recommendation per NCCN.   5. Ordering MRI of the cervical, thoracic, and lumbar spine with and without contrast  6. Continue alendronate 70 mg PO weekly for osteoporosis  7. Best supportive measures:  - Prescribing gabapentin 300 mg PO q8h prn neuropathic pain (#90 given on 1/20/25)  - Continue tramadol 50 mg PO q6h prn pain (#50 given on 1/8/25)  - Advised to use senna and Miralax daily if constipated  - Has tizanidine 4 mg PO q6h prn muscle soreness  - Fiber, protein, hydration, ambulation  - Support and encouragement provided  - Offered referrals to Cancer Counseling, Nutrition, Palliative Care, Integrative Medicine  8. Keep scheduled follow-up on 1/29/25       Counseling and coordination:    I have discussed my findings and further recommendations with the patient, her son, and a [facility] Advocate, who preferred to serve as , and answered all questions and she has verbalized understanding and is in agreement. I spent 30 minutes on this encounter  with the majority of the time being counseling and coordination, review of data and answering questions and discussions.    Thank you, Dr. Hook, for allowing me to participate in the care of this patient. Please do not hesitate to page me at [phone] with any questions or concerns.    Diogenes Santos MD    yes

## 2025-05-27 NOTE — PATIENT PROFILE ADULT - NSASFALLWHENOCCURRED_GEN_A_NUR
Urgent Care Clinic Visit    Chief Complaint   Patient presents with    Ankle Pain     Lt ankle pain, rolled 4wks ago during practice, has not gotten better, still some swelling, some pain with walking on ankle                5/27/2025     5:24 PM   Additional Questions   Roomed by Sylwia TABARES   Accompanied by Father     Sylwia Luongua on 5/27/2025 at 5:25 PM           last six months